# Patient Record
Sex: FEMALE | Race: WHITE | Employment: FULL TIME | URBAN - METROPOLITAN AREA
[De-identification: names, ages, dates, MRNs, and addresses within clinical notes are randomized per-mention and may not be internally consistent; named-entity substitution may affect disease eponyms.]

---

## 2020-02-12 ENCOUNTER — APPOINTMENT (OUTPATIENT)
Dept: CT IMAGING | Age: 35
End: 2020-02-12
Attending: PHYSICIAN ASSISTANT
Payer: OTHER GOVERNMENT

## 2020-02-12 ENCOUNTER — HOSPITAL ENCOUNTER (EMERGENCY)
Age: 35
Discharge: HOME | End: 2020-02-12
Attending: EMERGENCY MEDICINE
Payer: OTHER GOVERNMENT

## 2020-02-12 VITALS
RESPIRATION RATE: 16 BRPM | WEIGHT: 180 LBS | HEART RATE: 100 BPM | DIASTOLIC BLOOD PRESSURE: 70 MMHG | OXYGEN SATURATION: 98 % | BODY MASS INDEX: 32.92 KG/M2 | SYSTOLIC BLOOD PRESSURE: 109 MMHG | TEMPERATURE: 99.2 F

## 2020-02-12 DIAGNOSIS — R10.2 PELVIC PAIN: ICD-10-CM

## 2020-02-12 DIAGNOSIS — R10.84 ABDOMINAL PAIN, GENERALIZED: Primary | ICD-10-CM

## 2020-02-12 DIAGNOSIS — R11.0 NAUSEA: ICD-10-CM

## 2020-02-12 LAB
ALBUMIN SERPL-MCNC: 3.8 G/DL (ref 3.4–5)
ALBUMIN/GLOB SERPL: 0.9 {RATIO} (ref 0.8–1.7)
ALP SERPL-CCNC: 80 U/L (ref 45–117)
ALT SERPL-CCNC: 25 U/L (ref 13–56)
ANION GAP SERPL CALC-SCNC: 11 MMOL/L (ref 3–18)
APPEARANCE UR: CLEAR
AST SERPL-CCNC: 21 U/L (ref 10–38)
BASOPHILS # BLD: 0 K/UL (ref 0–0.1)
BASOPHILS NFR BLD: 0 % (ref 0–2)
BILIRUB SERPL-MCNC: 0.3 MG/DL (ref 0.2–1)
BILIRUB UR QL: NEGATIVE
BUN SERPL-MCNC: 10 MG/DL (ref 7–18)
BUN/CREAT SERPL: 8 (ref 12–20)
CALCIUM SERPL-MCNC: 9 MG/DL (ref 8.5–10.1)
CHLORIDE SERPL-SCNC: 111 MMOL/L (ref 100–111)
CO2 SERPL-SCNC: 22 MMOL/L (ref 21–32)
COLOR UR: YELLOW
CREAT SERPL-MCNC: 1.26 MG/DL (ref 0.6–1.3)
DIFFERENTIAL METHOD BLD: NORMAL
EOSINOPHIL # BLD: 0.3 K/UL (ref 0–0.4)
EOSINOPHIL NFR BLD: 3 % (ref 0–5)
ERYTHROCYTE [DISTWIDTH] IN BLOOD BY AUTOMATED COUNT: 13.6 % (ref 11.6–14.5)
GLOBULIN SER CALC-MCNC: 4.2 G/DL (ref 2–4)
GLUCOSE SERPL-MCNC: 105 MG/DL (ref 74–99)
GLUCOSE UR STRIP.AUTO-MCNC: NEGATIVE MG/DL
HCG UR QL: NEGATIVE
HCT VFR BLD AUTO: 36.6 % (ref 35–45)
HGB BLD-MCNC: 12.2 G/DL (ref 12–16)
HGB UR QL STRIP: NEGATIVE
KETONES UR QL STRIP.AUTO: NEGATIVE MG/DL
LEUKOCYTE ESTERASE UR QL STRIP.AUTO: NEGATIVE
LIPASE SERPL-CCNC: 136 U/L (ref 73–393)
LYMPHOCYTES # BLD: 2.9 K/UL (ref 0.9–3.6)
LYMPHOCYTES NFR BLD: 28 % (ref 21–52)
MCH RBC QN AUTO: 28.8 PG (ref 24–34)
MCHC RBC AUTO-ENTMCNC: 33.3 G/DL (ref 31–37)
MCV RBC AUTO: 86.5 FL (ref 74–97)
MONOCYTES # BLD: 0.7 K/UL (ref 0.05–1.2)
MONOCYTES NFR BLD: 7 % (ref 3–10)
NEUTS SEG # BLD: 6.4 K/UL (ref 1.8–8)
NEUTS SEG NFR BLD: 62 % (ref 40–73)
NITRITE UR QL STRIP.AUTO: NEGATIVE
PH UR STRIP: 8 [PH] (ref 5–8)
PLATELET # BLD AUTO: 384 K/UL (ref 135–420)
PMV BLD AUTO: 9.7 FL (ref 9.2–11.8)
POTASSIUM SERPL-SCNC: 3.5 MMOL/L (ref 3.5–5.5)
PROT SERPL-MCNC: 8 G/DL (ref 6.4–8.2)
PROT UR STRIP-MCNC: NEGATIVE MG/DL
RBC # BLD AUTO: 4.23 M/UL (ref 4.2–5.3)
SODIUM SERPL-SCNC: 144 MMOL/L (ref 136–145)
SP GR UR REFRACTOMETRY: <1.005 (ref 1–1.03)
UROBILINOGEN UR QL STRIP.AUTO: 0.2 EU/DL (ref 0.2–1)
WBC # BLD AUTO: 10.3 K/UL (ref 4.6–13.2)

## 2020-02-12 PROCEDURE — 81025 URINE PREGNANCY TEST: CPT

## 2020-02-12 PROCEDURE — 74011636320 HC RX REV CODE- 636/320: Performed by: EMERGENCY MEDICINE

## 2020-02-12 PROCEDURE — 99283 EMERGENCY DEPT VISIT LOW MDM: CPT

## 2020-02-12 PROCEDURE — 96375 TX/PRO/DX INJ NEW DRUG ADDON: CPT

## 2020-02-12 PROCEDURE — 74177 CT ABD & PELVIS W/CONTRAST: CPT

## 2020-02-12 PROCEDURE — 83690 ASSAY OF LIPASE: CPT

## 2020-02-12 PROCEDURE — 81003 URINALYSIS AUTO W/O SCOPE: CPT

## 2020-02-12 PROCEDURE — 74011250636 HC RX REV CODE- 250/636: Performed by: PHYSICIAN ASSISTANT

## 2020-02-12 PROCEDURE — 85025 COMPLETE CBC W/AUTO DIFF WBC: CPT

## 2020-02-12 PROCEDURE — 96374 THER/PROPH/DIAG INJ IV PUSH: CPT

## 2020-02-12 PROCEDURE — 80053 COMPREHEN METABOLIC PANEL: CPT

## 2020-02-12 RX ORDER — ONDANSETRON 4 MG/1
4 TABLET, ORALLY DISINTEGRATING ORAL
Qty: 15 TAB | Refills: 0 | Status: SHIPPED | OUTPATIENT
Start: 2020-02-12

## 2020-02-12 RX ORDER — MORPHINE SULFATE 4 MG/ML
4 INJECTION, SOLUTION INTRAMUSCULAR; INTRAVENOUS
Status: COMPLETED | OUTPATIENT
Start: 2020-02-12 | End: 2020-02-12

## 2020-02-12 RX ORDER — BUSPIRONE HYDROCHLORIDE 15 MG/1
15 TABLET ORAL 3 TIMES DAILY
COMMUNITY
End: 2020-09-05

## 2020-02-12 RX ORDER — TOPIRAMATE 100 MG/1
TABLET, FILM COATED ORAL 2 TIMES DAILY
COMMUNITY
End: 2020-09-05

## 2020-02-12 RX ORDER — ONDANSETRON 2 MG/ML
4 INJECTION INTRAMUSCULAR; INTRAVENOUS
Status: COMPLETED | OUTPATIENT
Start: 2020-02-12 | End: 2020-02-12

## 2020-02-12 RX ORDER — ACETAMINOPHEN 325 MG/1
650 TABLET ORAL
Qty: 20 TAB | Refills: 0 | Status: SHIPPED | OUTPATIENT
Start: 2020-02-12

## 2020-02-12 RX ORDER — MONTELUKAST SODIUM 10 MG/1
10 TABLET ORAL DAILY
COMMUNITY

## 2020-02-12 RX ADMIN — IOPAMIDOL 100 ML: 612 INJECTION, SOLUTION INTRAVENOUS at 15:42

## 2020-02-12 RX ADMIN — ONDANSETRON 4 MG: 2 INJECTION INTRAMUSCULAR; INTRAVENOUS at 14:36

## 2020-02-12 RX ADMIN — MORPHINE SULFATE 4 MG: 4 INJECTION, SOLUTION INTRAMUSCULAR; INTRAVENOUS at 14:36

## 2020-02-12 RX ADMIN — SODIUM CHLORIDE 1000 ML: 900 INJECTION, SOLUTION INTRAVENOUS at 14:36

## 2020-02-12 NOTE — ED PROVIDER NOTES
EMERGENCY DEPARTMENT HISTORY AND PHYSICAL EXAM    Date: 2/12/2020  Patient Name: Rhett Szymanski    History of Presenting Illness     Chief Complaint   Patient presents with    Abdominal Pain    Nausea         History Provided By: Patient    Chief Complaint: Abdominal pain, nausea  Duration: 3 days  Timing: Worsening  Location: Initially was periumbilical but now is diffuse  Quality: Aching and sharp  Severity: 7 out of 10  Modifying Factors: Movement  Associated Symptoms: none       Additional History (Context): Rhett Szymanski is a 37 y.o. female with a history of hypertension, depression and anxiety who presents today for history as listed above. Patient denies any history of abdominal surgeries. Patient states pain initially began around her umbilical region and now has become diffuse. Denies pain like this in the past.  Denies any surgeries to her abdomen but reports she has had a left ovarian cyst removal.  Denies any concern for pregnancy. Denies any other associated symptoms other than nausea. reports a fever of 100. Denies any constipation, diarrhea or vaginal complaints. Denies any alcohol, tobacco or drug abuse. Reports she has tried Tylenol and Gas-X at home without relief. Reports she tried to get in with her PCP but was unable to. PCP: Lesli Nielson MD    Current Outpatient Medications   Medication Sig Dispense Refill    montelukast (SINGULAIR) 10 mg tablet Take 10 mg by mouth daily.  topiramate (TOPAMAX) 100 mg tablet Take  by mouth two (2) times a day.  busPIRone (BUSPAR) 15 mg tablet Take 15 mg by mouth three (3) times daily.  acetaminophen (TYLENOL) 325 mg tablet Take 2 Tabs by mouth every four (4) hours as needed for Pain. 20 Tab 0    ondansetron (ZOFRAN ODT) 4 mg disintegrating tablet Take 1 Tab by mouth every eight (8) hours as needed for Nausea or Vomiting. 15 Tab 0    omeprazole (PRILOSEC) 20 mg capsule Take 20 mg by mouth daily.       aMILoride-hydroCHLOROthiazide (MODURETIC) 5-50 mg tab Take 1 Tab by mouth daily.  lamoTRIgine (LAMICTAL) 200 mg tablet Take  by mouth daily.  amLODIPine (NORVASC) 10 mg tablet Take  by mouth daily. Past History     Past Medical History:  Past Medical History:   Diagnosis Date    Gastrointestinal disorder     Hypertension     Psychiatric disorder        Past Surgical History:  Past Surgical History:   Procedure Laterality Date    HX GYN      Lt ovarian cyst removal 2017       Family History:  History reviewed. No pertinent family history. Social History:  Social History     Tobacco Use    Smoking status: Never Smoker    Smokeless tobacco: Never Used   Substance Use Topics    Alcohol use: No    Drug use: No       Allergies: Allergies   Allergen Reactions    Dilaudid [Hydromorphone] Anxiety    Nsaids (Non-Steroidal Anti-Inflammatory Drug) Other (comments)     Chronic kidney disease    Other Medication Other (comments)     Safris--restless leg    Sudafed [Pseudoephedrine Hcl] Other (comments)     Upsets her mood and asthma      Tegretol [Carbamazepine] Other (comments)     Rbc off--         Review of Systems   Review of Systems   Constitutional: Negative for chills and fever. HENT: Negative for congestion, rhinorrhea and sore throat. Respiratory: Negative for cough and shortness of breath. Cardiovascular: Negative for chest pain. Gastrointestinal: Positive for abdominal pain and nausea. Negative for blood in stool, constipation, diarrhea and vomiting. Genitourinary: Negative for dysuria, frequency and hematuria. Musculoskeletal: Negative for back pain and myalgias. Skin: Negative for rash and wound. Neurological: Negative for dizziness and headaches. All other systems reviewed and are negative.     All Other Systems Negative  Physical Exam     Vitals:    02/12/20 1321 02/12/20 1545   BP: 133/86 109/70   Pulse: 100    Resp: 16    Temp: 99.2 °F (37.3 °C)    SpO2: 98% 98%   Weight: 81.6 kg (180 lb)      Physical Exam  Vitals signs and nursing note reviewed. Constitutional:       General: She is not in acute distress. Appearance: She is well-developed. She is not diaphoretic. HENT:      Head: Normocephalic and atraumatic. Eyes:      Conjunctiva/sclera: Conjunctivae normal.   Neck:      Musculoskeletal: Normal range of motion and neck supple. Cardiovascular:      Rate and Rhythm: Normal rate and regular rhythm. Heart sounds: Normal heart sounds. Pulmonary:      Effort: Pulmonary effort is normal. No respiratory distress. Breath sounds: Normal breath sounds. Chest:      Chest wall: No tenderness. Abdominal:      General: Bowel sounds are normal. There is no distension. Palpations: Abdomen is soft. Tenderness: There is generalized abdominal tenderness. There is guarding and rebound. Musculoskeletal:         General: No deformity. Skin:     General: Skin is warm and dry. Neurological:      Mental Status: She is alert and oriented to person, place, and time. Deep Tendon Reflexes: Reflexes are normal and symmetric. Psychiatric:         Mood and Affect: Affect is tearful.            Diagnostic Study Results     Labs -     Recent Results (from the past 12 hour(s))   URINALYSIS W/ RFLX MICROSCOPIC    Collection Time: 02/12/20  2:36 PM   Result Value Ref Range    Color YELLOW      Appearance CLEAR      Specific gravity <1.005 (L) 1.005 - 1.030    pH (UA) 8.0 5.0 - 8.0      Protein NEGATIVE  NEG mg/dL    Glucose NEGATIVE  NEG mg/dL    Ketone NEGATIVE  NEG mg/dL    Bilirubin NEGATIVE  NEG      Blood NEGATIVE  NEG      Urobilinogen 0.2 0.2 - 1.0 EU/dL    Nitrites NEGATIVE  NEG      Leukocyte Esterase NEGATIVE  NEG     HCG URINE, QL    Collection Time: 02/12/20  2:36 PM   Result Value Ref Range    HCG urine, QL NEGATIVE  NEG     CBC WITH AUTOMATED DIFF    Collection Time: 02/12/20  2:36 PM   Result Value Ref Range    WBC 10.3 4.6 - 13.2 K/uL RBC 4.23 4.20 - 5.30 M/uL    HGB 12.2 12.0 - 16.0 g/dL    HCT 36.6 35.0 - 45.0 %    MCV 86.5 74.0 - 97.0 FL    MCH 28.8 24.0 - 34.0 PG    MCHC 33.3 31.0 - 37.0 g/dL    RDW 13.6 11.6 - 14.5 %    PLATELET 729 969 - 315 K/uL    MPV 9.7 9.2 - 11.8 FL    NEUTROPHILS 62 40 - 73 %    LYMPHOCYTES 28 21 - 52 %    MONOCYTES 7 3 - 10 %    EOSINOPHILS 3 0 - 5 %    BASOPHILS 0 0 - 2 %    ABS. NEUTROPHILS 6.4 1.8 - 8.0 K/UL    ABS. LYMPHOCYTES 2.9 0.9 - 3.6 K/UL    ABS. MONOCYTES 0.7 0.05 - 1.2 K/UL    ABS. EOSINOPHILS 0.3 0.0 - 0.4 K/UL    ABS. BASOPHILS 0.0 0.0 - 0.1 K/UL    DF AUTOMATED     METABOLIC PANEL, COMPREHENSIVE    Collection Time: 02/12/20  2:36 PM   Result Value Ref Range    Sodium 144 136 - 145 mmol/L    Potassium 3.5 3.5 - 5.5 mmol/L    Chloride 111 100 - 111 mmol/L    CO2 22 21 - 32 mmol/L    Anion gap 11 3.0 - 18 mmol/L    Glucose 105 (H) 74 - 99 mg/dL    BUN 10 7.0 - 18 MG/DL    Creatinine 1.26 0.6 - 1.3 MG/DL    BUN/Creatinine ratio 8 (L) 12 - 20      GFR est AA 58 (L) >60 ml/min/1.73m2    GFR est non-AA 48 (L) >60 ml/min/1.73m2    Calcium 9.0 8.5 - 10.1 MG/DL    Bilirubin, total 0.3 0.2 - 1.0 MG/DL    ALT (SGPT) 25 13 - 56 U/L    AST (SGOT) 21 10 - 38 U/L    Alk. phosphatase 80 45 - 117 U/L    Protein, total 8.0 6.4 - 8.2 g/dL    Albumin 3.8 3.4 - 5.0 g/dL    Globulin 4.2 (H) 2.0 - 4.0 g/dL    A-G Ratio 0.9 0.8 - 1.7     LIPASE    Collection Time: 02/12/20  2:36 PM   Result Value Ref Range    Lipase 136 73 - 393 U/L       Radiologic Studies -   CT ABD PELV W CONT   Final Result   IMPRESSION:      1. Left hydrosalpinx versus cluster of ovarian cysts. 2. Full bladder, likely contributing to mild bilateral renal collecting system   fullness. CT Results  (Last 48 hours)               02/12/20 1542  CT ABD PELV W CONT Final result    Impression:  IMPRESSION:       1. Left hydrosalpinx versus cluster of ovarian cysts.    2. Full bladder, likely contributing to mild bilateral renal collecting system fullness. Narrative:  CT ABDOMEN AND PELVIS WITH ENHANCEMENT       INDICATION: Acute abdominal pain, initially umbilical and now diffuse. TECHNIQUE: Axial images obtained of the abdomen and pelvis following the   uneventful administration of 100 cc Isovue-300 intravenous contrast.  Coronal   and sagittal reformatted images were obtained. All CT scans are performed using   dose optimization techniques as appropriate to the performed exam including the   following: Automated exposure control, adjustment of mA and/or kV according to   patient size, and use of iterative reconstructive technique. COMPARISON: None. FINDINGS:    Lung Base: Minimal atelectasis or scar in the left lower lobe. Liver: Unremarkable. Biliary: Unremarkable. Spleen: Unremarkable. Pancreas: Unremarkable. Adrenal Glands: Unremarkable. Kidneys: Mild bilateral collecting system fullness, without evidence of   obstructing cause. This may be due to full bladder. Bowel: Unremarkable. Normal size appendix. Bladder/ Pelvic Organs: Full bladder. Uterus is present. Cluster of ovarian   cysts versus hydrosalpinx at the left adnexa. Largest cystic components measure   4.2 and 3.4 cm. Peritoneum/ Retroperitoneum: Unremarkable. Lymph Nodes: Unremarkable. Vessels: Unremarkable for age. Bones/Soft tissues: Unremarkable for age. CXR Results  (Last 48 hours)    None            Medical Decision Making   I am the first provider for this patient. I reviewed the vital signs, available nursing notes, past medical history, past surgical history, family history and social history. Vital Signs-Reviewed the patient's vital signs.       Records Reviewed: Nursing Notes and Old Medical Records     Procedures: None   Procedures    Provider Notes (Medical Decision Making):     Differential Diagnosis: biliary disease, hepatitis, pancreatitis, PUD, gastritis, gastroenteritis, hiatal hernia, constipation, SBO, LBO,  IBS, IBD, Celiac disease,  malignancy, PID([facility])      Plan: Order labs, morphine, Zofran and CT of abdomen and pelvis. Order UA and urine pregnant. 4:13 PM  Patient is resting without any difficulties, reports pain has improved. Discussed CT findings with pt and Dr. Yash Umaña. Advised OBGYN and GI follow-up. Will discharge home with tylenol and zofran. Return precautions have been given. Have stressed the importance of hydration and rest as well as PCP follow-up. Patient agrees with the plan and management and states all questions have been thoroughly answered and there are no more remaining questions. MED RECONCILIATION:  No current facility-administered medications for this encounter. Current Outpatient Medications   Medication Sig    montelukast (SINGULAIR) 10 mg tablet Take 10 mg by mouth daily.  topiramate (TOPAMAX) 100 mg tablet Take  by mouth two (2) times a day.  busPIRone (BUSPAR) 15 mg tablet Take 15 mg by mouth three (3) times daily.  acetaminophen (TYLENOL) 325 mg tablet Take 2 Tabs by mouth every four (4) hours as needed for Pain.  ondansetron (ZOFRAN ODT) 4 mg disintegrating tablet Take 1 Tab by mouth every eight (8) hours as needed for Nausea or Vomiting.  omeprazole (PRILOSEC) 20 mg capsule Take 20 mg by mouth daily.  aMILoride-hydroCHLOROthiazide (MODURETIC) 5-50 mg tab Take 1 Tab by mouth daily.  lamoTRIgine (LAMICTAL) 200 mg tablet Take  by mouth daily.  amLODIPine (NORVASC) 10 mg tablet Take  by mouth daily. Disposition:  Home     DISCHARGE NOTE:   Pt has been reexamined. Patient has no new complaints, changes, or physical findings. Care plan outlined and precautions discussed. Results of workup were reviewed with the patient. All medications were reviewed with the patient. All of pt's questions and concerns were addressed.  Patient was instructed and agrees to follow up with PCP as well as to return to the ED upon further deterioration. Patient is ready to go home. Follow-up Information     Follow up With Specialties Details Why Contact Info    [facility] EMERGENCY DEPT Emergency Medicine  As needed [address]  [address] [place], [address] [address] [facility] [address], [address]   [address]  [address] [facility]   [phone] [id number]  [phone]       Follow-up in 1-2 days with OBGYN           Current Discharge Medication List      START taking these medications    Details   acetaminophen (TYLENOL) 325 mg tablet Take 2 Tabs by mouth every four (4) hours as needed for Pain. Qty: 20 Tab, Refills: 0      ondansetron (ZOFRAN ODT) 4 mg disintegrating tablet Take 1 Tab by mouth every eight (8) hours as needed for Nausea or Vomiting. Qty: 15 Tab, Refills: 0                 Diagnosis     Clinical Impression:   1. Abdominal pain, generalized    2. Pelvic pain    3. Nausea          \"Please note that this dictation was completed with [employer], the computer voice recognition software. Quite often unanticipated grammatical, syntax, homophones, and other interpretive errors are inadvertently transcribed by the computer software. Please disregard these errors. Please excuse any errors that have escaped final proofreading. \"

## 2020-02-12 NOTE — ED NOTES
Tamika Storm is a 37 y.o. female that was discharged in stable condition. The patients diagnosis, condition and treatment were explained to  patient and aftercare instructions were given. The patient verbalized understanding. Patient armband removed and shredded.

## 2020-02-12 NOTE — DISCHARGE INSTRUCTIONS

## 2020-02-12 NOTE — LETTER
[address] [place] [address] Dr [facility] EMERGENCY DEPT 
1976 [facility] [id number] [phone] Work/School Note Date: 2/12/2020 To Whom It May concern: 
 
Barber Ortiz was seen and treated today in the emergency room by the following provider(s): 
Attending Provider: Nicole Kayser, MD 
Physician Assistant: MKAAYLA Song. Barber Ortiz may return to work on 2/13/20 Sincerely, MAKAYLA Man

## 2020-09-05 ENCOUNTER — HOSPITAL ENCOUNTER (EMERGENCY)
Age: 35
Discharge: HOME | End: 2020-09-05
Attending: EMERGENCY MEDICINE | Admitting: EMERGENCY MEDICINE
Payer: OTHER GOVERNMENT

## 2020-09-05 ENCOUNTER — APPOINTMENT (OUTPATIENT)
Dept: CT IMAGING | Age: 35
End: 2020-09-05
Attending: NURSE PRACTITIONER
Payer: OTHER GOVERNMENT

## 2020-09-05 VITALS
HEART RATE: 80 BPM | WEIGHT: 184 LBS | RESPIRATION RATE: 16 BRPM | OXYGEN SATURATION: 99 % | HEIGHT: 62 IN | TEMPERATURE: 99.1 F | SYSTOLIC BLOOD PRESSURE: 100 MMHG | BODY MASS INDEX: 33.86 KG/M2 | DIASTOLIC BLOOD PRESSURE: 57 MMHG

## 2020-09-05 DIAGNOSIS — R11.0 NAUSEA WITHOUT VOMITING: ICD-10-CM

## 2020-09-05 DIAGNOSIS — R42 DIZZINESS: ICD-10-CM

## 2020-09-05 DIAGNOSIS — R51.9 NONINTRACTABLE HEADACHE, UNSPECIFIED CHRONICITY PATTERN, UNSPECIFIED HEADACHE TYPE: Primary | ICD-10-CM

## 2020-09-05 LAB
ALBUMIN SERPL-MCNC: 3.5 G/DL (ref 3.4–5)
ALBUMIN/GLOB SERPL: 0.9 {RATIO} (ref 0.8–1.7)
ALP SERPL-CCNC: 61 U/L (ref 45–117)
ALT SERPL-CCNC: 13 U/L (ref 13–56)
ANION GAP SERPL CALC-SCNC: 8 MMOL/L (ref 3–18)
APPEARANCE UR: ABNORMAL
AST SERPL-CCNC: 15 U/L (ref 10–38)
BACTERIA URNS QL MICRO: ABNORMAL /HPF
BASOPHILS # BLD: 0 K/UL (ref 0–0.1)
BASOPHILS NFR BLD: 0 % (ref 0–2)
BILIRUB SERPL-MCNC: 0.1 MG/DL (ref 0.2–1)
BILIRUB UR QL: NEGATIVE
BUN SERPL-MCNC: 9 MG/DL (ref 7–18)
BUN/CREAT SERPL: 6 (ref 12–20)
CALCIUM SERPL-MCNC: 9 MG/DL (ref 8.5–10.1)
CHLORIDE SERPL-SCNC: 112 MMOL/L (ref 100–111)
CO2 SERPL-SCNC: 21 MMOL/L (ref 21–32)
COLOR UR: YELLOW
CREAT SERPL-MCNC: 1.42 MG/DL (ref 0.6–1.3)
DIFFERENTIAL METHOD BLD: ABNORMAL
EOSINOPHIL # BLD: 0.4 K/UL (ref 0–0.4)
EOSINOPHIL NFR BLD: 4 % (ref 0–5)
EPITH CASTS URNS QL MICRO: ABNORMAL /LPF (ref 0–5)
ERYTHROCYTE [DISTWIDTH] IN BLOOD BY AUTOMATED COUNT: 14 % (ref 11.6–14.5)
GLOBULIN SER CALC-MCNC: 4.1 G/DL (ref 2–4)
GLUCOSE SERPL-MCNC: 111 MG/DL (ref 74–99)
GLUCOSE UR STRIP.AUTO-MCNC: NEGATIVE MG/DL
HCG UR QL: NEGATIVE
HCT VFR BLD AUTO: 37 % (ref 35–45)
HGB BLD-MCNC: 12.4 G/DL (ref 12–16)
HGB UR QL STRIP: NEGATIVE
KETONES UR QL STRIP.AUTO: NEGATIVE MG/DL
LEUKOCYTE ESTERASE UR QL STRIP.AUTO: ABNORMAL
LYMPHOCYTES # BLD: 4 K/UL (ref 0.9–3.6)
LYMPHOCYTES NFR BLD: 41 % (ref 21–52)
MCH RBC QN AUTO: 28 PG (ref 24–34)
MCHC RBC AUTO-ENTMCNC: 33.5 G/DL (ref 31–37)
MCV RBC AUTO: 83.5 FL (ref 74–97)
MONOCYTES # BLD: 0.7 K/UL (ref 0.05–1.2)
MONOCYTES NFR BLD: 7 % (ref 3–10)
NEUTS SEG # BLD: 4.8 K/UL (ref 1.8–8)
NEUTS SEG NFR BLD: 48 % (ref 40–73)
NITRITE UR QL STRIP.AUTO: NEGATIVE
PH UR STRIP: 7.5 [PH] (ref 5–8)
PLATELET # BLD AUTO: 443 K/UL (ref 135–420)
PMV BLD AUTO: 9.4 FL (ref 9.2–11.8)
POTASSIUM SERPL-SCNC: 3.6 MMOL/L (ref 3.5–5.5)
PROT SERPL-MCNC: 7.6 G/DL (ref 6.4–8.2)
PROT UR STRIP-MCNC: NEGATIVE MG/DL
RBC # BLD AUTO: 4.43 M/UL (ref 4.2–5.3)
SODIUM SERPL-SCNC: 141 MMOL/L (ref 136–145)
SP GR UR REFRACTOMETRY: <1.005 (ref 1–1.03)
UROBILINOGEN UR QL STRIP.AUTO: 0.2 EU/DL (ref 0.2–1)
WBC # BLD AUTO: 9.9 K/UL (ref 4.6–13.2)
WBC URNS QL MICRO: ABNORMAL /HPF (ref 0–4)

## 2020-09-05 PROCEDURE — 81025 URINE PREGNANCY TEST: CPT

## 2020-09-05 PROCEDURE — 96361 HYDRATE IV INFUSION ADD-ON: CPT

## 2020-09-05 PROCEDURE — 96375 TX/PRO/DX INJ NEW DRUG ADDON: CPT

## 2020-09-05 PROCEDURE — 99283 EMERGENCY DEPT VISIT LOW MDM: CPT

## 2020-09-05 PROCEDURE — 81001 URINALYSIS AUTO W/SCOPE: CPT

## 2020-09-05 PROCEDURE — 70450 CT HEAD/BRAIN W/O DYE: CPT

## 2020-09-05 PROCEDURE — 80053 COMPREHEN METABOLIC PANEL: CPT

## 2020-09-05 PROCEDURE — 85025 COMPLETE CBC W/AUTO DIFF WBC: CPT

## 2020-09-05 PROCEDURE — 74011250636 HC RX REV CODE- 250/636: Performed by: NURSE PRACTITIONER

## 2020-09-05 PROCEDURE — 96374 THER/PROPH/DIAG INJ IV PUSH: CPT

## 2020-09-05 RX ORDER — TOPIRAMATE 100 MG/1
100 TABLET, FILM COATED ORAL
COMMUNITY
End: 2020-10-18

## 2020-09-05 RX ORDER — TOPIRAMATE 100 MG/1
150 TABLET, FILM COATED ORAL 2 TIMES DAILY
COMMUNITY

## 2020-09-05 RX ORDER — ACETAMINOPHEN AND CODEINE PHOSPHATE 120; 12 MG/5ML; MG/5ML
SOLUTION ORAL
COMMUNITY

## 2020-09-05 RX ORDER — DIPHENHYDRAMINE HYDROCHLORIDE 50 MG/ML
50 INJECTION, SOLUTION INTRAMUSCULAR; INTRAVENOUS ONCE
Status: COMPLETED | OUTPATIENT
Start: 2020-09-05 | End: 2020-09-05

## 2020-09-05 RX ORDER — BUTALBITAL, ACETAMINOPHEN AND CAFFEINE 300; 40; 50 MG/1; MG/1; MG/1
1 CAPSULE ORAL
COMMUNITY
End: 2021-06-11

## 2020-09-05 RX ORDER — OMEPRAZOLE 40 MG/1
40 CAPSULE, DELAYED RELEASE ORAL DAILY
COMMUNITY

## 2020-09-05 RX ORDER — ALBUTEROL SULFATE 90 UG/1
AEROSOL, METERED RESPIRATORY (INHALATION)
COMMUNITY

## 2020-09-05 RX ORDER — PROCHLORPERAZINE EDISYLATE 5 MG/ML
10 INJECTION INTRAMUSCULAR; INTRAVENOUS ONCE
Status: COMPLETED | OUTPATIENT
Start: 2020-09-05 | End: 2020-09-05

## 2020-09-05 RX ORDER — FENOFIBRATE 145 MG/1
145 TABLET, COATED ORAL DAILY
COMMUNITY

## 2020-09-05 RX ORDER — DEXAMETHASONE SODIUM PHOSPHATE 4 MG/ML
10 INJECTION, SOLUTION INTRA-ARTICULAR; INTRALESIONAL; INTRAMUSCULAR; INTRAVENOUS; SOFT TISSUE
Status: COMPLETED | OUTPATIENT
Start: 2020-09-05 | End: 2020-09-05

## 2020-09-05 RX ORDER — PRAZOSIN HYDROCHLORIDE 1 MG/1
3 CAPSULE ORAL
COMMUNITY

## 2020-09-05 RX ORDER — BUSPIRONE HYDROCHLORIDE 10 MG/1
10 TABLET ORAL 2 TIMES DAILY
COMMUNITY

## 2020-09-05 RX ORDER — AMLODIPINE BESYLATE 5 MG/1
5 TABLET ORAL DAILY
COMMUNITY

## 2020-09-05 RX ORDER — PROMETHAZINE HYDROCHLORIDE 12.5 MG/1
12.5 TABLET ORAL
COMMUNITY

## 2020-09-05 RX ADMIN — DIPHENHYDRAMINE HYDROCHLORIDE 50 MG: 50 INJECTION, SOLUTION INTRAMUSCULAR; INTRAVENOUS at 16:56

## 2020-09-05 RX ADMIN — SODIUM CHLORIDE 1000 ML: 900 INJECTION, SOLUTION INTRAVENOUS at 16:52

## 2020-09-05 RX ADMIN — DEXAMETHASONE SODIUM PHOSPHATE 10 MG: 4 INJECTION, SOLUTION INTRAMUSCULAR; INTRAVENOUS at 17:54

## 2020-09-05 RX ADMIN — PROCHLORPERAZINE EDISYLATE 10 MG: 5 INJECTION INTRAMUSCULAR; INTRAVENOUS at 16:56

## 2020-09-05 NOTE — DISCHARGE INSTRUCTIONS
Patient Education        Dizziness: Care Instructions  Your Care Instructions  Dizziness is the feeling of unsteadiness or fuzziness in your head. It is different than having vertigo, which is a feeling that the room is spinning or that you are moving or falling. It is also different from lightheadedness, which is the feeling that you are about to faint. It can be hard to know what causes dizziness. Some people feel dizzy when they have migraine headaches. Sometimes bouts of flu can make you feel dizzy. Some medical conditions, such as heart problems or high blood pressure, can make you feel dizzy. Many medicines can cause dizziness, including medicines for high blood pressure, pain, or anxiety. If a medicine causes your symptoms, your doctor may recommend that you stop or change the medicine. If it is a problem with your heart, you may need medicine to help your heart work better. If there is no clear reason for your symptoms, your doctor may suggest watching and waiting for a while to see if the dizziness goes away on its own. Follow-up care is a key part of your treatment and safety. Be sure to make and go to all appointments, and call your doctor if you are having problems. It's also a good idea to know your test results and keep a list of the medicines you take. How can you care for yourself at home? · If your doctor recommends or prescribes medicine, take it exactly as directed. Call your doctor if you think you are having a problem with your medicine. · Do not drive while you feel dizzy. · Try to prevent falls. Steps you can take include:  ? Using nonskid mats, adding grab bars near the tub, and using night-lights. ? Clearing your home so that walkways are free of anything you might trip on.  ? Letting family and friends know that you have been feeling dizzy. This will help them know how to help you. When should you call for help? Call 911 anytime you think you may need emergency care.  For example, call if:    · You passed out (lost consciousness).     · You have dizziness along with symptoms of a heart attack. These may include:  ? Chest pain or pressure, or a strange feeling in the chest.  ? Sweating. ? Shortness of breath. ? Nausea or vomiting. ? Pain, pressure, or a strange feeling in the back, neck, jaw, or upper belly or in one or both shoulders or arms. ? Lightheadedness or sudden weakness. ? A fast or irregular heartbeat.     · You have symptoms of a stroke. These may include:  ? Sudden numbness, tingling, weakness, or loss of movement in your face, arm, or leg, especially on only one side of your body. ? Sudden vision changes. ? Sudden trouble speaking. ? Sudden confusion or trouble understanding simple statements. ? Sudden problems with walking or balance. ? A sudden, severe headache that is different from past headaches. Call your doctor now or seek immediate medical care if:    · You feel dizzy and have a fever, headache, or ringing in your ears.     · You have new or increased nausea and vomiting.     · Your dizziness does not go away or comes back. Watch closely for changes in your health, and be sure to contact your doctor if:    · You do not get better as expected. Where can you learn more? Go to http://delia-marilee.info/  Enter Q823 in the search box to learn more about \"Dizziness: Care Instructions. \"  Current as of: June 26, 2019               Content Version: 12.6  © 8111-4140 [employer]. Care instructions adapted under license by [employer] (which disclaims liability or warranty for this information). If you have questions about a medical condition or this instruction, always ask your healthcare professional. [place] [address] any warranty or liability for your use of this information. Patient Education        Headache: Care Instructions  Your Care Instructions     Headaches have many possible causes. Most headaches aren't a sign of a more serious problem, and they will get better on their own. Home treatment may help you feel better faster. The doctor has checked you carefully, but problems can develop later. If you notice any problems or new symptoms, get medical treatment right away. Follow-up care is a key part of your treatment and safety. Be sure to make and go to all appointments, and call your doctor if you are having problems. It's also a good idea to know your test results and keep a list of the medicines you take. How can you care for yourself at home? · Do not drive if you have taken a prescription pain medicine. · Rest in a quiet, dark room until your headache is gone. Close your eyes and try to relax or go to sleep. Don't watch TV or read. · Put a cold, moist cloth or cold pack on the painful area for 10 to 20 minutes at a time. Put a thin cloth between the cold pack and your skin. · Use a warm, moist towel or a heating pad set on low to relax tight shoulder and neck muscles. · Have someone gently massage your neck and shoulders. · Take pain medicines exactly as directed. ? If the doctor gave you a prescription medicine for pain, take it as prescribed. ? If you are not taking a prescription pain medicine, ask your doctor if you can take an over-the-counter medicine. · Be careful not to take pain medicine more often than the instructions allow, because you may get worse or more frequent headaches when the medicine wears off. · Do not ignore new symptoms that occur with a headache, such as a fever, weakness or numbness, vision changes, or confusion. These may be signs of a more serious problem. To prevent headaches  · Keep a headache diary so you can figure out what triggers your headaches. Avoiding triggers may help you prevent headaches. Record when each headache began, how long it lasted, and what the pain was like (throbbing, aching, stabbing, or dull).  Write down any other symptoms you had with the headache, such as nausea, flashing lights or dark spots, or sensitivity to bright light or loud noise. Note if the headache occurred near your period. List anything that might have triggered the headache, such as certain foods (chocolate, cheese, wine) or odors, smoke, bright light, stress, or lack of sleep. · Find healthy ways to deal with stress. Headaches are most common during or right after stressful times. Take time to relax before and after you do something that has caused a headache in the past.  · Try to keep your muscles relaxed by keeping good posture. Check your jaw, face, neck, and shoulder muscles for tension, and try relaxing them. When sitting at a desk, change positions often, and stretch for 30 seconds each hour. · Get plenty of sleep and exercise. · Eat regularly and well. Long periods without food can trigger a headache. · Treat yourself to a massage. Some people find that regular massages are very helpful in relieving tension. · Limit caffeine by not drinking too much coffee, tea, or soda. But don't quit caffeine suddenly, because that can also give you headaches. · Reduce eyestrain from computers by blinking frequently and looking away from the computer screen every so often. Make sure you have proper eyewear and that your monitor is set up properly, about an arm's length away. · Seek help if you have depression or anxiety. Your headaches may be linked to these conditions. Treatment can both prevent headaches and help with symptoms of anxiety or depression. When should you call for help? Call 911 anytime you think you may need emergency care. For example, call if:    · You have signs of a stroke. These may include:  ? Sudden numbness, paralysis, or weakness in your face, arm, or leg, especially on only one side of your body. ? Sudden vision changes. ? Sudden trouble speaking. ? Sudden confusion or trouble understanding simple statements.   ? Sudden problems with walking or balance. ? A sudden, severe headache that is different from past headaches. Call your doctor now or seek immediate medical care if:    · You have a new or worse headache.     · Your headache gets much worse. Where can you learn more? Go to http://delia-marilee.info/  Enter M271 in the search box to learn more about \"Headache: Care Instructions. \"  Current as of: November 20, 2019               Content Version: 12.6  © 8737-7696 [employer]. Care instructions adapted under license by [employer] (which disclaims liability or warranty for this information). If you have questions about a medical condition or this instruction, always ask your healthcare professional. [address] any warranty or liability for your use of this information. Return to ED if symptoms worsen. Continue taking fioricet and phenergan as needed.

## 2020-09-05 NOTE — ED NOTES
Yvette Carney is a 38 y.o. female that was discharged in good condition. The patients diagnosis, condition and treatment were explained to  patient and aftercare instructions were given. The patient verbalized understanding. Patient armband removed and shredded.

## 2020-09-05 NOTE — ED TRIAGE NOTES
Patient states having migraine headache since yesterday. She states being evaluated at urgent care last night. She states being prescribed Fioricet and Phenergan for symptoms. States taking 4 doses of each since evaluation.  Advises that she took phenergan prior to arrival.

## 2020-09-05 NOTE — ED PROVIDER NOTES
EMERGENCY DEPARTMENT HISTORY AND PHYSICAL EXAM    5:45 PM      Date: 9/5/2020  Patient Name: Donna Raymond    History of Presenting Illness     Chief Complaint   Patient presents with    Migraine    Dizziness    Nausea         History Provided By: Patient    Additional History (Context): Donna Raymond is a 38 y.o. female with History of migraines, end-stage kidney failure 3, hypertension, psychiatric problems, GERD who presents with complaint of migraine headache that started yesterday around 5 AM.  Patient reports she wants to patient first and was given Fioricet which relieved the symptoms temporarily but he came back. Patient was also given Phenergan for her symptoms. Patient reports taking Phenergan about 2 hours before prior to coming to ED. Patient reports her headache is worse today and is on the left side of her face. Patient reports she felt nauseous and dizzy. Patient denies any numbness, extremity weakness, chest pain, shortness of breath, visual disturbance. Patient denies any fevers or neck rigidity. PCP: Lilian Gaston MD    Current Facility-Administered Medications   Medication Dose Route Frequency Provider Last Rate Last Dose    dexamethasone (DECADRON) 4 mg/mL injection 10 mg  10 mg IntraVENous NOW Annamarie Brown NP         Current Outpatient Medications   Medication Sig Dispense Refill    amLODIPine (NORVASC) 5 mg tablet Take 5 mg by mouth daily.  omeprazole (PRILOSEC) 40 mg capsule Take 40 mg by mouth daily.  norethindrone (MICRONOR) 0.35 mg tab Take  by mouth.  topiramate (TOPAMAX) 100 mg tablet Take 150 mg by mouth daily.  topiramate (Topamax) 100 mg tablet Take 100 mg by mouth nightly.  prazosin (MINIPRESS) 1 mg capsule Take 3 mg by mouth nightly.  albuterol (PROVENTIL HFA, VENTOLIN HFA, PROAIR HFA) 90 mcg/actuation inhaler Take  by inhalation.       promethazine (PHENERGAN) 12.5 mg tablet Take 12.5 mg by mouth every six (6) hours as needed for Nausea.  fenofibrate nanocrystallized (Tricor) 145 mg tablet Take 145 mg by mouth daily.  busPIRone (BUSPAR) 10 mg tablet Take 10 mg by mouth two (2) times a day.  cpap machine kit by Does Not Apply route.  butalbital-acetaminophen-caff (Fioricet) -40 mg per capsule Take 1 Cap by mouth every four (4) hours as needed for Headache.  montelukast (SINGULAIR) 10 mg tablet Take 10 mg by mouth daily.  acetaminophen (TYLENOL) 325 mg tablet Take 2 Tabs by mouth every four (4) hours as needed for Pain. 20 Tab 0    ondansetron (ZOFRAN ODT) 4 mg disintegrating tablet Take 1 Tab by mouth every eight (8) hours as needed for Nausea or Vomiting. 15 Tab 0    lamoTRIgine (LAMICTAL) 200 mg tablet Take 200 mg by mouth two (2) times a day. Past History     Past Medical History:  Past Medical History:   Diagnosis Date    Endometriosis     Gastrointestinal disorder     Hypertension     Migraine     Psychiatric disorder        Past Surgical History:  Past Surgical History:   Procedure Laterality Date    HX GYN      Lt ovarian cyst removal 2017       Family History:  History reviewed. No pertinent family history. Social History:  Social History     Tobacco Use    Smoking status: Never Smoker    Smokeless tobacco: Never Used   Substance Use Topics    Alcohol use: No    Drug use: No       Allergies: Allergies   Allergen Reactions    Bacitracin Unknown (comments)    Dilaudid [Hydromorphone] Anxiety    Gold Sodium Thiomalate Unknown (comments)    Neomycin Unknown (comments)    Nsaids (Non-Steroidal Anti-Inflammatory Drug) Other (comments)     Chronic kidney disease    Other Medication Other (comments)     Safris--restless leg    Sudafed [Pseudoephedrine Hcl] Other (comments)     Upsets her mood and asthma      Tegretol [Carbamazepine] Other (comments)     Rbc off--         Review of Systems       Review of Systems   Constitutional: Negative for chills and fever. Respiratory: Negative for shortness of breath. Cardiovascular: Negative for chest pain. Gastrointestinal: Positive for nausea. Negative for abdominal pain and vomiting. Skin: Negative for rash. Neurological: Positive for dizziness and headaches. Negative for weakness. All other systems reviewed and are negative. Physical Exam     Visit Vitals  BP (!) 155/115 (BP 1 Location: Left arm, BP Patient Position: At rest)   Pulse (!) 115   Temp 99.1 °F (37.3 °C)   Resp 20   Ht 5' 2\" (1.575 m)   Wt 83.5 kg (184 lb)   LMP 03/05/2020   SpO2 100%   BMI 33.65 kg/m²         Physical Exam  Vitals signs reviewed. Constitutional:       General: She is not in acute distress. Appearance: Normal appearance. She is well-developed. She is not ill-appearing or toxic-appearing. Comments: Pt looks uncomfortable. Teary eyes. HENT:      Head: Normocephalic and atraumatic. Eyes:      Extraocular Movements: Extraocular movements intact. Right eye: Normal extraocular motion and no nystagmus. Left eye: Normal extraocular motion and no nystagmus. Conjunctiva/sclera: Conjunctivae normal.      Pupils: Pupils are equal, round, and reactive to light. Comments: No nystagmus. Normal EOM's. Eyes PERRLA   Neck:      Musculoskeletal: Normal range of motion. No neck rigidity or pain with movement. Trachea: Trachea normal.   Cardiovascular:      Rate and Rhythm: Normal rate and regular rhythm. Pulmonary:      Effort: Pulmonary effort is normal.      Breath sounds: Normal breath sounds. Abdominal:      General: Bowel sounds are normal. There is no distension or abdominal bruit. Palpations: Abdomen is soft. Abdomen is not rigid. There is no shifting dullness, fluid wave, mass or pulsatile mass. Tenderness: There is no abdominal tenderness. There is no guarding. Negative signs include Garay's sign and McBurney's sign. Neurological:      General: No focal deficit present.       Mental Status: She is alert and oriented to person, place, and time. Mental status is at baseline. Comments: Neurologically intact. Diagnostic Study Results     Labs -  Recent Results (from the past 12 hour(s))   CBC WITH AUTOMATED DIFF    Collection Time: 09/05/20  4:47 PM   Result Value Ref Range    WBC 9.9 4.6 - 13.2 K/uL    RBC 4.43 4.20 - 5.30 M/uL    HGB 12.4 12.0 - 16.0 g/dL    HCT 37.0 35.0 - 45.0 %    MCV 83.5 74.0 - 97.0 FL    MCH 28.0 24.0 - 34.0 PG    MCHC 33.5 31.0 - 37.0 g/dL    RDW 14.0 11.6 - 14.5 %    PLATELET 277 (H) 592 - 420 K/uL    MPV 9.4 9.2 - 11.8 FL    NEUTROPHILS 48 40 - 73 %    LYMPHOCYTES 41 21 - 52 %    MONOCYTES 7 3 - 10 %    EOSINOPHILS 4 0 - 5 %    BASOPHILS 0 0 - 2 %    ABS. NEUTROPHILS 4.8 1.8 - 8.0 K/UL    ABS. LYMPHOCYTES 4.0 (H) 0.9 - 3.6 K/UL    ABS. MONOCYTES 0.7 0.05 - 1.2 K/UL    ABS. EOSINOPHILS 0.4 0.0 - 0.4 K/UL    ABS. BASOPHILS 0.0 0.0 - 0.1 K/UL    DF AUTOMATED     METABOLIC PANEL, COMPREHENSIVE    Collection Time: 09/05/20  4:47 PM   Result Value Ref Range    Sodium 141 136 - 145 mmol/L    Potassium 3.6 3.5 - 5.5 mmol/L    Chloride 112 (H) 100 - 111 mmol/L    CO2 21 21 - 32 mmol/L    Anion gap 8 3.0 - 18 mmol/L    Glucose 111 (H) 74 - 99 mg/dL    BUN 9 7.0 - 18 MG/DL    Creatinine 1.42 (H) 0.6 - 1.3 MG/DL    BUN/Creatinine ratio 6 (L) 12 - 20      GFR est AA 50 (L) >60 ml/min/1.73m2    GFR est non-AA 42 (L) >60 ml/min/1.73m2    Calcium 9.0 8.5 - 10.1 MG/DL    Bilirubin, total 0.1 (L) 0.2 - 1.0 MG/DL    ALT (SGPT) 13 13 - 56 U/L    AST (SGOT) 15 10 - 38 U/L    Alk.  phosphatase 61 45 - 117 U/L    Protein, total 7.6 6.4 - 8.2 g/dL    Albumin 3.5 3.4 - 5.0 g/dL    Globulin 4.1 (H) 2.0 - 4.0 g/dL    A-G Ratio 0.9 0.8 - 1.7     URINALYSIS W/ RFLX MICROSCOPIC    Collection Time: 09/05/20  4:48 PM   Result Value Ref Range    Color YELLOW      Appearance CLOUDY      Specific gravity <1.005 (L) 1.005 - 1.030    pH (UA) 7.5 5.0 - 8.0      Protein Negative NEG mg/dL Glucose Negative NEG mg/dL    Ketone Negative NEG mg/dL    Bilirubin Negative NEG      Blood Negative NEG      Urobilinogen 0.2 0.2 - 1.0 EU/dL    Nitrites Negative NEG      Leukocyte Esterase SMALL (A) NEG     HCG URINE, QL    Collection Time: 09/05/20  4:48 PM   Result Value Ref Range    HCG urine, QL Negative NEG     URINE MICROSCOPIC ONLY    Collection Time: 09/05/20  4:48 PM   Result Value Ref Range    WBC 0 to 3 0 - 4 /hpf    Epithelial cells 2+ 0 - 5 /lpf    Bacteria 1+ (A) NEG /hpf       Radiologic Studies -   CT HEAD WO CONT   Final Result   IMPRESSION:                  No acute abnormalities. Medical Decision Making   I am the first provider for this patient. I reviewed the vital signs, available nursing notes, past medical history, past surgical history, family history and social history. Vital Signs-Reviewed the patient's vital signs. Records Reviewed: Nursing Notes and Old Medical Records (Time of Review: 5:45 PM)    ED Course: Progress Notes, Reevaluation, and Consults:  5:45 PM Pt was sleeping. Stated she felt a bit better. Will reassess. 6:16 PM Pt resting comfortably, vital signs improved greatly. Pt reports pain is down to 4/10 and feels ready to go home. Plan to discharge patient. Pt in no acute distress. Provider Notes (Medical Decision Making):   38 y.o. female with History of migraines, end-stage kidney failure 3, hypertension, psychiatric problems, GERD who presents with complaint of migraine headache that started yesterday around 5 AM.  Patient reports she wants to patient first and was given Fioricet which relieved the symptoms temporarily but he came back. Patient was also given Phenergan for her symptoms. Patient reports taking Phenergan about 2 hours before prior to coming to ED. Patient reports her headache is worse today and is on the left side of her face. Patient reports she felt nauseous and dizzy.   Patient denies any numbness, extremity weakness, chest pain, shortness of breath, visual disturbance. Patient denies any fevers or neck rigidity. Pt given migraine cocktail. Ct done since symptoms kept persisting and patient voiced onset of nausea and dizziness. Ct was unremarkable. Pt symptoms improved with migraine cocktail and is resting comfortably. Pt reports the headache is consistent with her prior migraines. I do not suspect stroke at this time. Pt neurologically intact. Labs showed elevated creatinine and decreased GFR which is consistent with her kidney disease. Plan to discharge patient to follow up with her neurologist.     Diagnosis     Clinical Impression: No diagnosis found. Disposition: home     Follow-up Information    None          Patient's Medications   Start Taking    No medications on file   Continue Taking    ACETAMINOPHEN (TYLENOL) 325 MG TABLET    Take 2 Tabs by mouth every four (4) hours as needed for Pain. ALBUTEROL (PROVENTIL HFA, VENTOLIN HFA, PROAIR HFA) 90 MCG/ACTUATION INHALER    Take  by inhalation. AMLODIPINE (NORVASC) 5 MG TABLET    Take 5 mg by mouth daily. BUSPIRONE (BUSPAR) 10 MG TABLET    Take 10 mg by mouth two (2) times a day. BUTALBITAL-ACETAMINOPHEN-CAFF (FIORICET) -40 MG PER CAPSULE    Take 1 Cap by mouth every four (4) hours as needed for Headache. CPAP MACHINE KIT    by Does Not Apply route. FENOFIBRATE NANOCRYSTALLIZED (TRICOR) 145 MG TABLET    Take 145 mg by mouth daily. LAMOTRIGINE (LAMICTAL) 200 MG TABLET    Take 200 mg by mouth two (2) times a day. MONTELUKAST (SINGULAIR) 10 MG TABLET    Take 10 mg by mouth daily. NORETHINDRONE (MICRONOR) 0.35 MG TAB    Take  by mouth. OMEPRAZOLE (PRILOSEC) 40 MG CAPSULE    Take 40 mg by mouth daily. ONDANSETRON (ZOFRAN ODT) 4 MG DISINTEGRATING TABLET    Take 1 Tab by mouth every eight (8) hours as needed for Nausea or Vomiting. PRAZOSIN (MINIPRESS) 1 MG CAPSULE    Take 3 mg by mouth nightly.     PROMETHAZINE (PHENERGAN) 12.5 MG TABLET    Take 12.5 mg by mouth every six (6) hours as needed for Nausea. TOPIRAMATE (TOPAMAX) 100 MG TABLET    Take 150 mg by mouth daily. TOPIRAMATE (TOPAMAX) 100 MG TABLET    Take 100 mg by mouth nightly. These Medications have changed    No medications on file   Stop Taking    AMILORIDE-HYDROCHLOROTHIAZIDE (MODURETIC) 5-50 MG TAB    Take 1 Tab by mouth daily. AMLODIPINE (NORVASC) 10 MG TABLET    Take  by mouth daily. BUSPIRONE (BUSPAR) 15 MG TABLET    Take 15 mg by mouth three (3) times daily. OMEPRAZOLE (PRILOSEC) 20 MG CAPSULE    Take 20 mg by mouth daily. TOPIRAMATE (TOPAMAX) 100 MG TABLET    Take  by mouth two (2) times a day. Dictation disclaimer:  Please note that this dictation was completed with [email or web address], the [employer] voice recognition software. Quite often unanticipated grammatical, syntax, homophones, and other interpretive errors are inadvertently transcribed by the computer software. Please disregard these errors. Please excuse any errors that have escaped final proofreading.

## 2020-09-07 PROCEDURE — 99284 EMERGENCY DEPT VISIT MOD MDM: CPT

## 2020-09-07 PROCEDURE — 96365 THER/PROPH/DIAG IV INF INIT: CPT

## 2020-09-07 PROCEDURE — 96372 THER/PROPH/DIAG INJ SC/IM: CPT

## 2020-09-07 PROCEDURE — 96375 TX/PRO/DX INJ NEW DRUG ADDON: CPT

## 2020-09-08 ENCOUNTER — APPOINTMENT (OUTPATIENT)
Dept: CT IMAGING | Age: 35
End: 2020-09-08
Attending: EMERGENCY MEDICINE
Payer: OTHER GOVERNMENT

## 2020-09-08 ENCOUNTER — HOSPITAL ENCOUNTER (EMERGENCY)
Age: 35
Discharge: HOME | End: 2020-09-08
Attending: EMERGENCY MEDICINE
Payer: OTHER GOVERNMENT

## 2020-09-08 VITALS
SYSTOLIC BLOOD PRESSURE: 108 MMHG | TEMPERATURE: 98.6 F | BODY MASS INDEX: 33.86 KG/M2 | HEART RATE: 72 BPM | RESPIRATION RATE: 18 BRPM | WEIGHT: 184 LBS | HEIGHT: 62 IN | DIASTOLIC BLOOD PRESSURE: 79 MMHG | OXYGEN SATURATION: 99 %

## 2020-09-08 DIAGNOSIS — G43.801 OTHER MIGRAINE WITH STATUS MIGRAINOSUS, NOT INTRACTABLE: Primary | ICD-10-CM

## 2020-09-08 LAB
ANION GAP SERPL CALC-SCNC: 8 MMOL/L (ref 3–18)
BASOPHILS # BLD: 0 K/UL (ref 0–0.1)
BASOPHILS NFR BLD: 0 % (ref 0–2)
BUN SERPL-MCNC: 16 MG/DL (ref 7–18)
BUN/CREAT SERPL: 12 (ref 12–20)
CALCIUM SERPL-MCNC: 8.7 MG/DL (ref 8.5–10.1)
CHLORIDE SERPL-SCNC: 113 MMOL/L (ref 100–111)
CO2 SERPL-SCNC: 20 MMOL/L (ref 21–32)
CREAT SERPL-MCNC: 1.29 MG/DL (ref 0.6–1.3)
CRP SERPL-MCNC: 1.5 MG/DL (ref 0–0.3)
DIFFERENTIAL METHOD BLD: ABNORMAL
EOSINOPHIL # BLD: 0.3 K/UL (ref 0–0.4)
EOSINOPHIL NFR BLD: 3 % (ref 0–5)
ERYTHROCYTE [DISTWIDTH] IN BLOOD BY AUTOMATED COUNT: 13.9 % (ref 11.6–14.5)
ERYTHROCYTE [SEDIMENTATION RATE] IN BLOOD: 12 MM/HR (ref 0–20)
GLUCOSE SERPL-MCNC: 109 MG/DL (ref 74–99)
HCG SERPL QL: NEGATIVE
HCT VFR BLD AUTO: 35.4 % (ref 35–45)
HGB BLD-MCNC: 12.1 G/DL (ref 12–16)
LYMPHOCYTES # BLD: 4.4 K/UL (ref 0.9–3.6)
LYMPHOCYTES NFR BLD: 37 % (ref 21–52)
MCH RBC QN AUTO: 28.5 PG (ref 24–34)
MCHC RBC AUTO-ENTMCNC: 34.2 G/DL (ref 31–37)
MCV RBC AUTO: 83.5 FL (ref 74–97)
MONOCYTES # BLD: 0.5 K/UL (ref 0.05–1.2)
MONOCYTES NFR BLD: 5 % (ref 3–10)
NEUTS SEG # BLD: 6.7 K/UL (ref 1.8–8)
NEUTS SEG NFR BLD: 55 % (ref 40–73)
PLATELET # BLD AUTO: 401 K/UL (ref 135–420)
PMV BLD AUTO: 9.7 FL (ref 9.2–11.8)
POTASSIUM SERPL-SCNC: 3.4 MMOL/L (ref 3.5–5.5)
RBC # BLD AUTO: 4.24 M/UL (ref 4.2–5.3)
SODIUM SERPL-SCNC: 141 MMOL/L (ref 136–145)
WBC # BLD AUTO: 12 K/UL (ref 4.6–13.2)

## 2020-09-08 PROCEDURE — 74011000636 HC RX REV CODE- 636: Performed by: EMERGENCY MEDICINE

## 2020-09-08 PROCEDURE — 84703 CHORIONIC GONADOTROPIN ASSAY: CPT

## 2020-09-08 PROCEDURE — 85652 RBC SED RATE AUTOMATED: CPT

## 2020-09-08 PROCEDURE — 96365 THER/PROPH/DIAG IV INF INIT: CPT

## 2020-09-08 PROCEDURE — 86140 C-REACTIVE PROTEIN: CPT

## 2020-09-08 PROCEDURE — 96372 THER/PROPH/DIAG INJ SC/IM: CPT

## 2020-09-08 PROCEDURE — 70496 CT ANGIOGRAPHY HEAD: CPT

## 2020-09-08 PROCEDURE — 74011250636 HC RX REV CODE- 250/636: Performed by: EMERGENCY MEDICINE

## 2020-09-08 PROCEDURE — 74011636637 HC RX REV CODE- 636/637: Performed by: EMERGENCY MEDICINE

## 2020-09-08 PROCEDURE — 80048 BASIC METABOLIC PNL TOTAL CA: CPT

## 2020-09-08 PROCEDURE — 96375 TX/PRO/DX INJ NEW DRUG ADDON: CPT

## 2020-09-08 PROCEDURE — 74011250637 HC RX REV CODE- 250/637: Performed by: EMERGENCY MEDICINE

## 2020-09-08 PROCEDURE — 85025 COMPLETE CBC W/AUTO DIFF WBC: CPT

## 2020-09-08 RX ORDER — DIPHENHYDRAMINE HYDROCHLORIDE 50 MG/ML
12.5 INJECTION, SOLUTION INTRAMUSCULAR; INTRAVENOUS
Status: COMPLETED | OUTPATIENT
Start: 2020-09-08 | End: 2020-09-08

## 2020-09-08 RX ORDER — DIVALPROEX SODIUM 125 MG/1
1000 CAPSULE, COATED PELLETS ORAL ONCE
Status: DISCONTINUED | OUTPATIENT
Start: 2020-09-08 | End: 2020-09-08

## 2020-09-08 RX ORDER — DIVALPROEX SODIUM 500 MG/1
1000 TABLET, EXTENDED RELEASE ORAL ONCE
Status: DISCONTINUED | OUTPATIENT
Start: 2020-09-08 | End: 2020-09-08

## 2020-09-08 RX ORDER — METOCLOPRAMIDE HYDROCHLORIDE 5 MG/ML
10 INJECTION INTRAMUSCULAR; INTRAVENOUS
Status: COMPLETED | OUTPATIENT
Start: 2020-09-08 | End: 2020-09-08

## 2020-09-08 RX ORDER — PROCHLORPERAZINE EDISYLATE 5 MG/ML
10 INJECTION INTRAMUSCULAR; INTRAVENOUS ONCE
Status: COMPLETED | OUTPATIENT
Start: 2020-09-08 | End: 2020-09-08

## 2020-09-08 RX ORDER — SUMATRIPTAN 6 MG/.5ML
6 INJECTION, SOLUTION SUBCUTANEOUS ONCE
Status: COMPLETED | OUTPATIENT
Start: 2020-09-08 | End: 2020-09-08

## 2020-09-08 RX ORDER — LORAZEPAM 2 MG/ML
1 INJECTION INTRAMUSCULAR
Status: COMPLETED | OUTPATIENT
Start: 2020-09-08 | End: 2020-09-08

## 2020-09-08 RX ORDER — MAGNESIUM SULFATE HEPTAHYDRATE 40 MG/ML
2 INJECTION, SOLUTION INTRAVENOUS ONCE
Status: COMPLETED | OUTPATIENT
Start: 2020-09-08 | End: 2020-09-08

## 2020-09-08 RX ORDER — DEXAMETHASONE SODIUM PHOSPHATE 4 MG/ML
10 INJECTION, SOLUTION INTRA-ARTICULAR; INTRALESIONAL; INTRAMUSCULAR; INTRAVENOUS; SOFT TISSUE
Status: COMPLETED | OUTPATIENT
Start: 2020-09-08 | End: 2020-09-08

## 2020-09-08 RX ORDER — MAGNESIUM SULFATE HEPTAHYDRATE 500 MG/ML
2 INJECTION, SOLUTION INTRAMUSCULAR; INTRAVENOUS
Status: DISCONTINUED | OUTPATIENT
Start: 2020-09-08 | End: 2020-09-08

## 2020-09-08 RX ORDER — DIVALPROEX SODIUM 250 MG/1
1000 TABLET, DELAYED RELEASE ORAL ONCE
Status: COMPLETED | OUTPATIENT
Start: 2020-09-08 | End: 2020-09-08

## 2020-09-08 RX ADMIN — SODIUM CHLORIDE 1000 ML: 900 INJECTION, SOLUTION INTRAVENOUS at 03:14

## 2020-09-08 RX ADMIN — DEXAMETHASONE SODIUM PHOSPHATE 10 MG: 4 INJECTION, SOLUTION INTRA-ARTICULAR; INTRALESIONAL; INTRAMUSCULAR; INTRAVENOUS; SOFT TISSUE at 01:56

## 2020-09-08 RX ADMIN — METOCLOPRAMIDE 10 MG: 5 INJECTION, SOLUTION INTRAMUSCULAR; INTRAVENOUS at 00:36

## 2020-09-08 RX ADMIN — PROCHLORPERAZINE EDISYLATE 10 MG: 5 INJECTION INTRAMUSCULAR; INTRAVENOUS at 01:57

## 2020-09-08 RX ADMIN — DIPHENHYDRAMINE HYDROCHLORIDE 12.5 MG: 50 INJECTION, SOLUTION INTRAMUSCULAR; INTRAVENOUS at 00:39

## 2020-09-08 RX ADMIN — LORAZEPAM 1 MG: 2 INJECTION INTRAMUSCULAR at 03:15

## 2020-09-08 RX ADMIN — SUMATRIPTAN 6 MG: 6 INJECTION, SOLUTION SUBCUTANEOUS at 00:39

## 2020-09-08 RX ADMIN — MAGNESIUM SULFATE HEPTAHYDRATE 2 G: 40 INJECTION, SOLUTION INTRAVENOUS at 03:17

## 2020-09-08 RX ADMIN — SODIUM CHLORIDE 1000 ML: 900 INJECTION, SOLUTION INTRAVENOUS at 00:36

## 2020-09-08 RX ADMIN — DIVALPROEX SODIUM 1000 MG: 250 TABLET, DELAYED RELEASE ORAL at 04:06

## 2020-09-08 RX ADMIN — IOPAMIDOL 80 ML: 755 INJECTION, SOLUTION INTRAVENOUS at 04:38

## 2020-09-08 NOTE — ED PROVIDER NOTES
EMERGENCY DEPARTMENT HISTORY AND PHYSICAL EXAM    12:13 AM      Date: 9/8/2020  Patient Name: Arron Lobo    History of Presenting Illness     Chief Complaint   Patient presents with    Headache         History Provided By: Patient    Chief Complaint: headache  Duration:  Hours  Timing:  Worsening  Location: right temporal  Quality: Sharp and Pressure  Severity: Severe  Modifying Factors: worse with light  Associated Symptoms: denies any other associated signs or symptoms      Additional History (Context): Arron Lobo is a 38 y.o. female with Migraine, CKD, psychiatric disorder who presents with right-sided headache. Was seen 2 days ago for similar at that time was placed over 100 left eye. Reports that time pain was about 7 out of 10. Today is 10 out of 10. Got significantly worse between 9 and 11 PM this evening. Initially it started around 1 PM today. Has taken 2 Fioricet without improvement. Also took 50 of Benadryl at 7 PM.  Reports worsened by lights and noise. Has tried applying ice. Reports being on Topamax daily for migraines but has not had any issue in multiple years. Does not have a neurologist here. Feels nauseous but no vomiting. No fever. No known sick contacts. PCP: Hayley Noguera MD    Current Outpatient Medications   Medication Sig Dispense Refill    amLODIPine (NORVASC) 5 mg tablet Take 5 mg by mouth daily.  omeprazole (PRILOSEC) 40 mg capsule Take 40 mg by mouth daily.  topiramate (TOPAMAX) 100 mg tablet Take 150 mg by mouth daily.  topiramate (Topamax) 100 mg tablet Take 100 mg by mouth nightly.  prazosin (MINIPRESS) 1 mg capsule Take 3 mg by mouth nightly.  fenofibrate nanocrystallized (Tricor) 145 mg tablet Take 145 mg by mouth daily.  busPIRone (BUSPAR) 10 mg tablet Take 10 mg by mouth two (2) times a day.  montelukast (SINGULAIR) 10 mg tablet Take 10 mg by mouth daily.       lamoTRIgine (LAMICTAL) 200 mg tablet Take 200 mg by mouth two (2) times a day.  norethindrone (MICRONOR) 0.35 mg tab Take  by mouth.  albuterol (PROVENTIL HFA, VENTOLIN HFA, PROAIR HFA) 90 mcg/actuation inhaler Take  by inhalation.  promethazine (PHENERGAN) 12.5 mg tablet Take 12.5 mg by mouth every six (6) hours as needed for Nausea.  cpap machine kit by Does Not Apply route.  butalbital-acetaminophen-caff (Fioricet) -40 mg per capsule Take 1 Cap by mouth every four (4) hours as needed for Headache.  acetaminophen (TYLENOL) 325 mg tablet Take 2 Tabs by mouth every four (4) hours as needed for Pain. 20 Tab 0    ondansetron (ZOFRAN ODT) 4 mg disintegrating tablet Take 1 Tab by mouth every eight (8) hours as needed for Nausea or Vomiting. 15 Tab 0       Past History     Past Medical History:  Past Medical History:   Diagnosis Date    Endometriosis     Gastrointestinal disorder     Hypertension     Migraine     Psychiatric disorder        Past Surgical History:  Past Surgical History:   Procedure Laterality Date    HX GYN      Lt ovarian cyst removal 2017       Family History:  No family history on file. Social History:  Social History     Tobacco Use    Smoking status: Never Smoker    Smokeless tobacco: Never Used   Substance Use Topics    Alcohol use: No    Drug use: No       Allergies: Allergies   Allergen Reactions    Bacitracin Unknown (comments)    Dilaudid [Hydromorphone] Anxiety    Gold Sodium Thiomalate Unknown (comments)    Neomycin Unknown (comments)    Nsaids (Non-Steroidal Anti-Inflammatory Drug) Other (comments)     Chronic kidney disease    Other Medication Other (comments)     Safris--restless leg    Sudafed [Pseudoephedrine Hcl] Other (comments)     Upsets her mood and asthma      Tegretol [Carbamazepine] Other (comments)     Rbc off--         Review of Systems       Review of Systems   Constitutional: Negative for chills and fever. Eyes: Positive for photophobia.    Neurological: Positive for headaches. All other systems reviewed and are negative. Physical Exam     Visit Vitals  BP 92/76   Pulse 84   Temp 98.6 °F (37 °C)   Resp 20   Ht 5' 2\" (1.575 m)   Wt 83.5 kg (184 lb)   SpO2 97%   BMI 33.65 kg/m²         Physical Exam  Constitutional:       Appearance: She is well-developed. Comments: Appears uncomfortable, holding ice pack to right side of the head   HENT:      Head: Normocephalic and atraumatic. Neck:      Musculoskeletal: Neck supple. Vascular: No JVD. Cardiovascular:      Rate and Rhythm: Normal rate and regular rhythm. Pulmonary:      Effort: Pulmonary effort is normal. No respiratory distress. Breath sounds: Normal breath sounds. Abdominal:      General: There is no distension. Palpations: Abdomen is soft. Tenderness: There is no abdominal tenderness. There is no guarding or rebound. Musculoskeletal:      Comments: No joint tenderness   Skin:     General: Skin is warm and dry. Findings: No erythema. Neurological:      General: No focal deficit present. Mental Status: She is alert and oriented to person, place, and time. Comments: Normal gait   Psychiatric:      Comments: Tearful,           Diagnostic Study Results     Labs -  Recent Results (from the past 12 hour(s))   CBC WITH AUTOMATED DIFF    Collection Time: 09/08/20  3:06 AM   Result Value Ref Range    WBC 12.0 4.6 - 13.2 K/uL    RBC 4.24 4.20 - 5.30 M/uL    HGB 12.1 12.0 - 16.0 g/dL    HCT 35.4 35.0 - 45.0 %    MCV 83.5 74.0 - 97.0 FL    MCH 28.5 24.0 - 34.0 PG    MCHC 34.2 31.0 - 37.0 g/dL    RDW 13.9 11.6 - 14.5 %    PLATELET 429 719 - 143 K/uL    MPV 9.7 9.2 - 11.8 FL    NEUTROPHILS 55 40 - 73 %    LYMPHOCYTES 37 21 - 52 %    MONOCYTES 5 3 - 10 %    EOSINOPHILS 3 0 - 5 %    BASOPHILS 0 0 - 2 %    ABS. NEUTROPHILS 6.7 1.8 - 8.0 K/UL    ABS. LYMPHOCYTES 4.4 (H) 0.9 - 3.6 K/UL    ABS. MONOCYTES 0.5 0.05 - 1.2 K/UL    ABS. EOSINOPHILS 0.3 0.0 - 0.4 K/UL    ABS. BASOPHILS 0.0 0.0 - 0.1 K/UL    DF AUTOMATED     METABOLIC PANEL, BASIC    Collection Time: 09/08/20  3:06 AM   Result Value Ref Range    Sodium 141 136 - 145 mmol/L    Potassium 3.4 (L) 3.5 - 5.5 mmol/L    Chloride 113 (H) 100 - 111 mmol/L    CO2 20 (L) 21 - 32 mmol/L    Anion gap 8 3.0 - 18 mmol/L    Glucose 109 (H) 74 - 99 mg/dL    BUN 16 7.0 - 18 MG/DL    Creatinine 1.29 0.6 - 1.3 MG/DL    BUN/Creatinine ratio 12 12 - 20      GFR est AA 56 (L) >60 ml/min/1.73m2    GFR est non-AA 47 (L) >60 ml/min/1.73m2    Calcium 8.7 8.5 - 10.1 MG/DL   SED RATE (ESR)    Collection Time: 09/08/20  3:06 AM   Result Value Ref Range    Sed rate, automated 12 0 - 20 mm/hr   HCG QL SERUM    Collection Time: 09/08/20  3:06 AM   Result Value Ref Range    HCG, Ql. Negative NEG         Radiologic Studies -   CTA HEAD NECK W WO CONT    (Results Pending)     CTA: No evidence of large artery occlusion or flow-limiting stenosis. No evidence of aneurysm. Prelim read. Medical Decision Making   I am the first provider for this patient. I reviewed the vital signs, available nursing notes, past medical history, past surgical history, family history and social history. Vital Signs-Reviewed the patient's vital signs. Pulse Oximetry Analysis -  99 on room air (Interpretation)nl       Records Reviewed: Nursing Notes and Old Medical Records (Time of Review: 12:13 AM)    ED Course: Progress Notes, Reevaluation, and Consults:      Provider Notes (Medical Decision Making): 78-year-old female with history of migraines presents with right-sided headache. Symptoms consistent with migraine. Reviewed imaging from 2 days ago. Doubt need for repeat. Will try to treat first.  No infectious symptoms consistent with meningitis. 1:48 AM  Patient pain 7 out of 10 after meds. Will give additional doses. 3:02 AM  Consult with Dr. Lalitha Sanford, tele-neurology, recommends giving, magnesium and Depakote assuming negative pregnancy.   Recommends CTA head and neck with venous comments. Recommends checking basic labs with inflammatory markers. 6:16 AM  Patient was sleeping comfortably. She is now feeling much better. She feels stable for discharge home. Have discussed importance of PCP and neurology follow-up as an outpatient. Discussed return precautions. Diagnosis     Clinical Impression:   1. Other migraine with status migrainosus, not intractable        Disposition: discharged    Follow-up Information     Follow up With Specialties Details Why Contact Info    Estefany Bernabe MD [facility] Medicine Schedule an appointment as soon as possible for a visit in 3 days  [address]      Tiffany Finney MD Neurology Schedule an appointment as soon as possible for a visit in 1 week  [address] [facility]  [phone] [id number] [facility] EMERGENCY DEPT Emergency Medicine  As needed, If symptoms worsen [address] [facility]  [phone]           Patient's Medications   Start Taking    No medications on file   Continue Taking    ACETAMINOPHEN (TYLENOL) 325 MG TABLET    Take 2 Tabs by mouth every four (4) hours as needed for Pain. ALBUTEROL (PROVENTIL HFA, VENTOLIN HFA, PROAIR HFA) 90 MCG/ACTUATION INHALER    Take  by inhalation. AMLODIPINE (NORVASC) 5 MG TABLET    Take 5 mg by mouth daily. BUSPIRONE (BUSPAR) 10 MG TABLET    Take 10 mg by mouth two (2) times a day. BUTALBITAL-ACETAMINOPHEN-CAFF (FIORICET) -40 MG PER CAPSULE    Take 1 Cap by mouth every four (4) hours as needed for Headache. CPAP MACHINE KIT    by Does Not Apply route. FENOFIBRATE NANOCRYSTALLIZED (TRICOR) 145 MG TABLET    Take 145 mg by mouth daily. LAMOTRIGINE (LAMICTAL) 200 MG TABLET    Take 200 mg by mouth two (2) times a day. MONTELUKAST (SINGULAIR) 10 MG TABLET    Take 10 mg by mouth daily. NORETHINDRONE (MICRONOR) 0.35 MG TAB    Take  by mouth. OMEPRAZOLE (PRILOSEC) 40 MG CAPSULE    Take 40 mg by mouth daily. ONDANSETRON (ZOFRAN ODT) 4 MG DISINTEGRATING TABLET    Take 1 Tab by mouth every eight (8) hours as needed for Nausea or Vomiting. PRAZOSIN (MINIPRESS) 1 MG CAPSULE    Take 3 mg by mouth nightly. PROMETHAZINE (PHENERGAN) 12.5 MG TABLET    Take 12.5 mg by mouth every six (6) hours as needed for Nausea. TOPIRAMATE (TOPAMAX) 100 MG TABLET    Take 150 mg by mouth daily. TOPIRAMATE (TOPAMAX) 100 MG TABLET    Take 100 mg by mouth nightly.    These Medications have changed    No medications on file   Stop Taking    No medications on file     _______________________________

## 2020-09-08 NOTE — DISCHARGE INSTRUCTIONS

## 2020-10-12 ENCOUNTER — OFFICE VISIT (OUTPATIENT)
Dept: NEUROLOGY | Age: 35
End: 2020-10-12
Payer: OTHER GOVERNMENT

## 2020-10-12 ENCOUNTER — TELEPHONE (OUTPATIENT)
Dept: NEUROLOGY | Age: 35
End: 2020-10-12

## 2020-10-12 VITALS
RESPIRATION RATE: 16 BRPM | OXYGEN SATURATION: 98 % | SYSTOLIC BLOOD PRESSURE: 122 MMHG | WEIGHT: 175.6 LBS | HEIGHT: 62 IN | BODY MASS INDEX: 32.31 KG/M2 | TEMPERATURE: 96.8 F | HEART RATE: 103 BPM | DIASTOLIC BLOOD PRESSURE: 78 MMHG

## 2020-10-12 DIAGNOSIS — G43.101 MIGRAINE WITH AURA AND WITH STATUS MIGRAINOSUS, NOT INTRACTABLE: Primary | ICD-10-CM

## 2020-10-12 PROCEDURE — 99203 OFFICE O/P NEW LOW 30 MIN: CPT | Performed by: STUDENT IN AN ORGANIZED HEALTH CARE EDUCATION/TRAINING PROGRAM

## 2020-10-12 RX ORDER — RIZATRIPTAN BENZOATE 10 MG/1
10 TABLET, ORALLY DISINTEGRATING ORAL
Qty: 10 TAB | Refills: 4 | Status: SHIPPED | OUTPATIENT
Start: 2020-10-12 | End: 2020-10-12

## 2020-10-12 NOTE — PROGRESS NOTES
Cyn Stephen is a 38 y.o. female . presents for New Patient   . A 38years old female patient with medical history of endometriosis, hypertension, and migraine headaches here for evaluation of her headaches. She has been having headaches since her teenage years. Feels she is getting worse. Headaches mostly preceded by visual auras: Sparkles, light sensitivity, sometimes blurring of vision. Mostly unilateral stabbing/continuous, severe, lasting for a couple of days. Has photophobia and sensitivity to loud noise. Has nausea and dry heaving. Headache gets worse with movement. She cannot function well having the severe migraine headaches. She has this type of severe attacks about twice a year. She also has milder headaches about once a week. Also unilateral, 5 out of 10 in severity, with mild photophobia but no nausea or vomiting. Tylenol helps with this type of headaches. Might last a whole day. Headache triggers include lack of sleep. No obvious dietary triggers. She has hallucinations mainly auditory. No visual hallucinations. Following with psychiatry. She has been on topiramate since 2012 as a mood stabilizer. Currently takes 150 mg p.o. twice daily. She has been to the emergency room recently for a very severe headache (on September 8, 2020). She has a CT scan of the head which was unremarkable. CTA of the head and neck were unremarkable. Review of Systems   Constitutional: Positive for weight loss. Negative for chills and fever. HENT: Negative for hearing loss and tinnitus. Eyes: Negative for blurred vision and double vision. Respiratory: Negative for cough and shortness of breath. Cardiovascular: Negative for chest pain and leg swelling. Gastrointestinal: Positive for nausea (with the migraines) and vomiting (dry heaves with migraines). Genitourinary: Negative for dysuria, frequency and urgency. Musculoskeletal: Negative for back pain and neck pain.    Skin: Positive for itching and rash. Neurological: Positive for dizziness (no spinning), tremors (from the meds) and headaches. Negative for speech change, focal weakness and seizures. Endo/Heme/Allergies: Does not bruise/bleed easily. Psychiatric/Behavioral: Positive for depression (On medications). Negative for suicidal ideas. The patient has insomnia. Past Medical History:   Diagnosis Date    Endometriosis     Gastrointestinal disorder     Hypertension     Migraine     Psychiatric disorder        Past Surgical History:   Procedure Laterality Date    HX GYN      Lt ovarian cyst removal 2017        History reviewed. No pertinent family history.      Social History     Socioeconomic History    Marital status: SINGLE     Spouse name: Not on file    Number of children: Not on file    Years of education: Not on file    Highest education level: Not on file   Occupational History    Not on file   Social Needs    Financial resource strain: Not on file    Food insecurity     Worry: Not on file     Inability: Not on file    Transportation needs     Medical: Not on file     Non-medical: Not on file   Tobacco Use    Smoking status: Never Smoker    Smokeless tobacco: Never Used   Substance and Sexual Activity    Alcohol use: No    Drug use: No    Sexual activity: Not on file   Lifestyle    Physical activity     Days per week: Not on file     Minutes per session: Not on file    Stress: Not on file   Relationships    Social connections     Talks on phone: Not on file     Gets together: Not on file     Attends Congregation service: Not on file     Active member of club or organization: Not on file     Attends meetings of clubs or organizations: Not on file     Relationship status: Not on file    Intimate partner violence     Fear of current or ex partner: Not on file     Emotionally abused: Not on file     Physically abused: Not on file     Forced sexual activity: Not on file   Other Topics Concern    Not on file   Social History Narrative    Not on file        Allergies   Allergen Reactions    Bacitracin Unknown (comments)    Dilaudid [Hydromorphone] Anxiety    Gold Sodium Thiomalate Unknown (comments)    Neomycin Unknown (comments)    Nsaids (Non-Steroidal Anti-Inflammatory Drug) Other (comments)     Chronic kidney disease    Other Medication Other (comments)     Safris--restless leg    Sudafed [Pseudoephedrine Hcl] Other (comments)     Upsets her mood and asthma      Tegretol [Carbamazepine] Other (comments)     Rbc off--         Current Outpatient Medications   Medication Sig Dispense Refill    amLODIPine (NORVASC) 5 mg tablet Take 5 mg by mouth daily.  omeprazole (PRILOSEC) 40 mg capsule Take 40 mg by mouth daily.  norethindrone (MICRONOR) 0.35 mg tab Take  by mouth.  topiramate (TOPAMAX) 100 mg tablet Take 150 mg by mouth daily.  topiramate (Topamax) 100 mg tablet Take 100 mg by mouth nightly.  prazosin (MINIPRESS) 1 mg capsule Take 3 mg by mouth nightly.  albuterol (PROVENTIL HFA, VENTOLIN HFA, PROAIR HFA) 90 mcg/actuation inhaler Take  by inhalation.  promethazine (PHENERGAN) 12.5 mg tablet Take 12.5 mg by mouth every six (6) hours as needed for Nausea.  fenofibrate nanocrystallized (Tricor) 145 mg tablet Take 145 mg by mouth daily.  busPIRone (BUSPAR) 10 mg tablet Take 10 mg by mouth two (2) times a day.  cpap machine kit by Does Not Apply route.  butalbital-acetaminophen-caff (Fioricet) -40 mg per capsule Take 1 Cap by mouth every four (4) hours as needed for Headache.  montelukast (SINGULAIR) 10 mg tablet Take 10 mg by mouth daily.  acetaminophen (TYLENOL) 325 mg tablet Take 2 Tabs by mouth every four (4) hours as needed for Pain. 20 Tab 0    ondansetron (ZOFRAN ODT) 4 mg disintegrating tablet Take 1 Tab by mouth every eight (8) hours as needed for Nausea or Vomiting.  15 Tab 0    lamoTRIgine (LAMICTAL) 200 mg tablet Take 200 mg by mouth two (2) times a day. Physical Exam  Constitutional:       Appearance: Normal appearance. HENT:      Head: Normocephalic and atraumatic. Mouth/Throat:      Mouth: Mucous membranes are moist.      Pharynx: Oropharynx is clear. No oropharyngeal exudate. Eyes:      Extraocular Movements: Extraocular movements intact. Pupils: Pupils are equal, round, and reactive to light. Neck:      Musculoskeletal: Normal range of motion and neck supple. Pulmonary:      Effort: Pulmonary effort is normal. No respiratory distress. Musculoskeletal: Normal range of motion. Right lower leg: No edema. Left lower leg: No edema. Neurological:      Mental Status: She is alert. Comments: Mental status: Awake, alert, oriented x3, follows simple and complex commands, no neglect.   Speech and languge: fluent, coherent, naming and repitition intact, reading and comprehension intact  CN: Funduscopy showed clear disc margins; VFF, EOMI, PERRLA, face sensation intact , no facial asymmetry noted, palate elevation symmetric bilat, SS+SCM 5/5 bilat, tongue midline  Motor: no pronator drift, tone normal throughout, strength 5/5 throughout  Sensory: intact to light touch and PP  throughout  Coordination: FNF, HS accurate w/o dysmetria  DTR: 2+ throughout  Gait: normal.             Admission on 09/08/2020, Discharged on 09/08/2020   Component Date Value Ref Range Status    WBC 09/08/2020 12.0  4.6 - 13.2 K/uL Final    RBC 09/08/2020 4.24  4.20 - 5.30 M/uL Final    HGB 09/08/2020 12.1  12.0 - 16.0 g/dL Final    HCT 09/08/2020 35.4  35.0 - 45.0 % Final    MCV 09/08/2020 83.5  74.0 - 97.0 FL Final    MCH 09/08/2020 28.5  24.0 - 34.0 PG Final    MCHC 09/08/2020 34.2  31.0 - 37.0 g/dL Final    RDW 09/08/2020 13.9  11.6 - 14.5 % Final    PLATELET 17/60/7405 840  135 - 420 K/uL Final    MPV 09/08/2020 9.7  9.2 - 11.8 FL Final    NEUTROPHILS 09/08/2020 55  40 - 73 % Final    LYMPHOCYTES 09/08/2020 37  21 - 52 % Final    MONOCYTES 09/08/2020 5  3 - 10 % Final    EOSINOPHILS 09/08/2020 3  0 - 5 % Final    BASOPHILS 09/08/2020 0  0 - 2 % Final    ABS. NEUTROPHILS 09/08/2020 6.7  1.8 - 8.0 K/UL Final    ABS. LYMPHOCYTES 09/08/2020 4.4* 0.9 - 3.6 K/UL Final    ABS. MONOCYTES 09/08/2020 0.5  0.05 - 1.2 K/UL Final    ABS. EOSINOPHILS 09/08/2020 0.3  0.0 - 0.4 K/UL Final    ABS. BASOPHILS 09/08/2020 0.0  0.0 - 0.1 K/UL Final    DF 09/08/2020 AUTOMATED    Final    Sodium 09/08/2020 141  136 - 145 mmol/L Final    Potassium 09/08/2020 3.4* 3.5 - 5.5 mmol/L Final    Chloride 09/08/2020 113* 100 - 111 mmol/L Final    CO2 09/08/2020 20* 21 - 32 mmol/L Final    Anion gap 09/08/2020 8  3.0 - 18 mmol/L Final    Glucose 09/08/2020 109* 74 - 99 mg/dL Final    BUN 09/08/2020 16  7.0 - 18 MG/DL Final    Creatinine 09/08/2020 1.29  0.6 - 1.3 MG/DL Final    BUN/Creatinine ratio 09/08/2020 12  12 - 20   Final    GFR est AA 09/08/2020 56* >60 ml/min/1.73m2 Final    GFR est non-AA 09/08/2020 47* >60 ml/min/1.73m2 Final    Comment: (NOTE)  Estimated GFR is calculated using the Modification of Diet in Renal   Disease (MDRD) Study equation, reported for both  Americans   (GFRAA) and non- Americans (GFRNA), and normalized to 1.73m2   body surface area. The physician must decide which value applies to   the patient. The MDRD study equation should only be used in   individuals age 25 or older. It has not been validated for the   following: pregnant women, patients with serious comorbid conditions,   or on certain medications, or persons with extremes of body size,   muscle mass, or nutritional status.       Calcium 09/08/2020 8.7  8.5 - 10.1 MG/DL Final    Sed rate, automated 09/08/2020 12  0 - 20 mm/hr Final    C-Reactive protein 09/08/2020 1.5* 0 - 0.3 mg/dL Final    HCG, Ql. 09/08/2020 Negative  NEG   Final    Test results should be confirmed using serum quantitative hCG when detection of pregnancy is critical and before performing any critical medical procedure. Admission on 09/05/2020, Discharged on 09/05/2020   Component Date Value Ref Range Status    WBC 09/05/2020 9.9  4.6 - 13.2 K/uL Final    RBC 09/05/2020 4.43  4.20 - 5.30 M/uL Final    HGB 09/05/2020 12.4  12.0 - 16.0 g/dL Final    HCT 09/05/2020 37.0  35.0 - 45.0 % Final    MCV 09/05/2020 83.5  74.0 - 97.0 FL Final    MCH 09/05/2020 28.0  24.0 - 34.0 PG Final    MCHC 09/05/2020 33.5  31.0 - 37.0 g/dL Final    RDW 09/05/2020 14.0  11.6 - 14.5 % Final    PLATELET 77/63/3945 280* 135 - 420 K/uL Final    MPV 09/05/2020 9.4  9.2 - 11.8 FL Final    NEUTROPHILS 09/05/2020 48  40 - 73 % Final    LYMPHOCYTES 09/05/2020 41  21 - 52 % Final    MONOCYTES 09/05/2020 7  3 - 10 % Final    EOSINOPHILS 09/05/2020 4  0 - 5 % Final    BASOPHILS 09/05/2020 0  0 - 2 % Final    ABS. NEUTROPHILS 09/05/2020 4.8  1.8 - 8.0 K/UL Final    ABS. LYMPHOCYTES 09/05/2020 4.0* 0.9 - 3.6 K/UL Final    ABS. MONOCYTES 09/05/2020 0.7  0.05 - 1.2 K/UL Final    ABS. EOSINOPHILS 09/05/2020 0.4  0.0 - 0.4 K/UL Final    ABS.  BASOPHILS 09/05/2020 0.0  0.0 - 0.1 K/UL Final    DF 09/05/2020 AUTOMATED    Final    Sodium 09/05/2020 141  136 - 145 mmol/L Final    Potassium 09/05/2020 3.6  3.5 - 5.5 mmol/L Final    Chloride 09/05/2020 112* 100 - 111 mmol/L Final    CO2 09/05/2020 21  21 - 32 mmol/L Final    Anion gap 09/05/2020 8  3.0 - 18 mmol/L Final    Glucose 09/05/2020 111* 74 - 99 mg/dL Final    BUN 09/05/2020 9  7.0 - 18 MG/DL Final    Creatinine 09/05/2020 1.42* 0.6 - 1.3 MG/DL Final    BUN/Creatinine ratio 09/05/2020 6* 12 - 20   Final    GFR est AA 09/05/2020 50* >60 ml/min/1.73m2 Final    GFR est non-AA 09/05/2020 42* >60 ml/min/1.73m2 Final    Comment: (NOTE)  Estimated GFR is calculated using the Modification of Diet in Renal   Disease (MDRD) Study equation, reported for both  Americans   (GFRAA) and non- Americans (GFRNA), and normalized to 1.73m2   body surface area. The physician must decide which value applies to   the patient. The MDRD study equation should only be used in   individuals age 25 or older. It has not been validated for the   following: pregnant women, patients with serious comorbid conditions,   or on certain medications, or persons with extremes of body size,   muscle mass, or nutritional status.  Calcium 09/05/2020 9.0  8.5 - 10.1 MG/DL Final    Bilirubin, total 09/05/2020 0.1* 0.2 - 1.0 MG/DL Final    ALT (SGPT) 09/05/2020 13  13 - 56 U/L Final    AST (SGOT) 09/05/2020 15  10 - 38 U/L Final    Alk. phosphatase 09/05/2020 61  45 - 117 U/L Final    Protein, total 09/05/2020 7.6  6.4 - 8.2 g/dL Final    Albumin 09/05/2020 3.5  3.4 - 5.0 g/dL Final    Globulin 09/05/2020 4.1* 2.0 - 4.0 g/dL Final    A-G Ratio 09/05/2020 0.9  0.8 - 1.7   Final    Color 09/05/2020 YELLOW    Final    Appearance 09/05/2020 CLOUDY    Final    Specific gravity 09/05/2020 <1.005* 1.005 - 1.030 Final    pH (UA) 09/05/2020 7.5  5.0 - 8.0   Final    Protein 09/05/2020 Negative  NEG mg/dL Final    Glucose 09/05/2020 Negative  NEG mg/dL Final    Ketone 09/05/2020 Negative  NEG mg/dL Final    Bilirubin 09/05/2020 Negative  NEG   Final    Blood 09/05/2020 Negative  NEG   Final    Urobilinogen 09/05/2020 0.2  0.2 - 1.0 EU/dL Final    Nitrites 09/05/2020 Negative  NEG   Final    Leukocyte Esterase 09/05/2020 SMALL* NEG   Final    HCG urine, QL 09/05/2020 Negative  NEG   Final    Test results should be confirmed using serum quantitative hCG when detection of pregnancy is critical and before performing any critical medical procedure.  WBC 09/05/2020 0 to 3  0 - 4 /hpf Final    Epithelial cells 09/05/2020 2+  0 - 5 /lpf Final    Bacteria 09/05/2020 1+* NEG /hpf Final             ICD-10-CM ICD-9-CM    1.  Migraine with aura and with status migrainosus, not intractable  G43.101 346.03 rizatriptan (MAXALT-MLT) 10 mg disintegrating tablet       A 38years old female patient evaluation of migraine headache. Has headache since her teenage years. Migraine with aura [mainly visual aura] about twice a year but headache is very severe and lasts for 2 to 3 days. Over-the-counter pain medications do not work. Mostly go to the emergency room. Has been tried with sumatriptan once in the emergency room which did not help. We will start her on rizatriptan 10 mg p.o. as needed. She has milder headaches about once a week for which she takes Tylenol and helps. She is currently on topiramate 150 mg p.o. twice daily for bipolar. It would also help for migraine prophylaxis. We will see her in 3 months time.

## 2020-10-18 ENCOUNTER — HOSPITAL ENCOUNTER (EMERGENCY)
Age: 35
Discharge: HOME | End: 2020-10-18
Attending: EMERGENCY MEDICINE
Payer: OTHER GOVERNMENT

## 2020-10-18 ENCOUNTER — APPOINTMENT (OUTPATIENT)
Dept: ULTRASOUND IMAGING | Age: 35
End: 2020-10-18
Attending: EMERGENCY MEDICINE
Payer: OTHER GOVERNMENT

## 2020-10-18 VITALS
HEART RATE: 95 BPM | SYSTOLIC BLOOD PRESSURE: 139 MMHG | HEIGHT: 62 IN | TEMPERATURE: 98.5 F | BODY MASS INDEX: 33.31 KG/M2 | RESPIRATION RATE: 18 BRPM | OXYGEN SATURATION: 99 % | WEIGHT: 181 LBS | DIASTOLIC BLOOD PRESSURE: 89 MMHG

## 2020-10-18 DIAGNOSIS — N83.202 CYST OF LEFT OVARY: Primary | ICD-10-CM

## 2020-10-18 LAB
APPEARANCE UR: CLEAR
BACTERIA URNS QL MICRO: ABNORMAL /HPF
BILIRUB UR QL: NEGATIVE
COLOR UR: YELLOW
EPITH CASTS URNS QL MICRO: ABNORMAL /LPF (ref 0–5)
GLUCOSE UR STRIP.AUTO-MCNC: NEGATIVE MG/DL
HCG UR QL: NEGATIVE
HGB UR QL STRIP: NEGATIVE
KETONES UR QL STRIP.AUTO: NEGATIVE MG/DL
LEUKOCYTE ESTERASE UR QL STRIP.AUTO: ABNORMAL
NITRITE UR QL STRIP.AUTO: NEGATIVE
PH UR STRIP: 7 [PH] (ref 5–8)
PROT UR STRIP-MCNC: NEGATIVE MG/DL
SP GR UR REFRACTOMETRY: <1.005 (ref 1–1.03)
UROBILINOGEN UR QL STRIP.AUTO: 0.2 EU/DL (ref 0.2–1)
WBC URNS QL MICRO: ABNORMAL /HPF (ref 0–4)

## 2020-10-18 PROCEDURE — 96372 THER/PROPH/DIAG INJ SC/IM: CPT

## 2020-10-18 PROCEDURE — 76830 TRANSVAGINAL US NON-OB: CPT

## 2020-10-18 PROCEDURE — 81001 URINALYSIS AUTO W/SCOPE: CPT

## 2020-10-18 PROCEDURE — 99283 EMERGENCY DEPT VISIT LOW MDM: CPT

## 2020-10-18 PROCEDURE — 74011250636 HC RX REV CODE- 250/636: Performed by: EMERGENCY MEDICINE

## 2020-10-18 PROCEDURE — 81025 URINE PREGNANCY TEST: CPT

## 2020-10-18 RX ORDER — ARIPIPRAZOLE 10 MG/1
10 TABLET ORAL
COMMUNITY
End: 2021-02-12

## 2020-10-18 RX ORDER — KETOROLAC TROMETHAMINE 15 MG/ML
15 INJECTION, SOLUTION INTRAMUSCULAR; INTRAVENOUS
Status: COMPLETED | OUTPATIENT
Start: 2020-10-18 | End: 2020-10-18

## 2020-10-18 RX ADMIN — KETOROLAC TROMETHAMINE 15 MG: 15 INJECTION, SOLUTION INTRAMUSCULAR; INTRAVENOUS at 17:11

## 2020-10-18 NOTE — DISCHARGE INSTRUCTIONS
1.  Ultrasound consistent with left 3 to 3.5 cm ovarian cyst.  Pregnancy negative. 2.  Follow-up with your gynecologist for recheck this week. 3.  Continue Tylenol. Judicious use of Norco as previously prescribed okay. 4.  Return for high fever, intractable pain, heavy vaginal bleeding, or other acutely worsening symptoms. 5. Pregnancy negative. Patient Education        Functional Ovarian Cyst: Care Instructions  Your Care Instructions     A functional ovarian cyst is a sac that forms on the surface of a woman's ovary during ovulation. The sac holds a maturing egg. Usually the sac goes away after the egg is released. But if the egg is not released, or if the sac closes up after the egg is released, the sac can swell up with fluid and form a cyst.  Functional ovarian cysts are different than ovarian growths caused by other problems, such as cancer. Most functional ovarian cysts cause no symptoms and go away on their own. Some cause mild pain. Others can cause severe pain when they rupture or bleed. Follow-up care is a key part of your treatment and safety. Be sure to make and go to all appointments, and call your doctor if you are having problems. It's also a good idea to know your test results and keep a list of the medicines you take. How can you care for yourself at home? · Use heat, such as a hot water bottle, a heating pad set on low, or a warm bath, to relax tense muscles and relieve cramping. · Be safe with medicines. Take pain medicines exactly as directed. ? If the doctor gave you a prescription medicine for pain, take it as prescribed. ? If you are not taking a prescription pain medicine, ask your doctor if you can take an over-the-counter medicine. · Avoid constipation. Make sure you drink enough fluids and include fruits, vegetables, and fiber in your diet each day. Constipation does not cause ovarian cysts, but it may make your pelvic pain worse. When should you call for help?    Call your doctor now or seek immediate medical care if:    · You have severe vaginal bleeding.     · You have new or worse belly or pelvic pain. Watch closely for changes in your health, and be sure to contact your doctor if:    · You have unusual vaginal bleeding.     · You do not get better as expected. Where can you learn more? Go to http://delia-marilee.info/  Enter I547 in the search box to learn more about \"Functional Ovarian Cyst: Care Instructions. \"  Current as of: November 8, 2019               Content Version: 12.6  © 5743-5344 [employer]. Care instructions adapted under license by [employer] (which disclaims liability or warranty for this information). If you have questions about a medical condition or this instruction, always ask your healthcare professional. [address] any warranty or liability for your use of this information.

## 2020-10-18 NOTE — ED PROVIDER NOTES
75-year-old female history of endometriosis prior ovarian cysts presents for evaluation of pelvic pain. Pain feels similar to prior episode associated with large cyst that required surgical drainage. Onset of pain over the last couple of days. This is increased in intensity over the last 24 hours. Patient seen at local urgent care where she had urinalysis, urine pregnancy test, and pelvic exam performed. Noted to be markedly uncomfortable and referred to our facility for additional evaluation. Fever. No dysuria. No vaginal discharge. Past Medical History:   Diagnosis Date    Anxiety     Asthma     Bipolar 1 disorder ([address]     Endometriosis     Endometriosis     Gastrointestinal disorder     GERD (gastroesophageal reflux disease)     Hypertension     Infertility, female     Migraine     Ovarian cyst     Psychiatric disorder     PTSD (post-traumatic stress disorder)     Sleep apnea        Past Surgical History:   Procedure Laterality Date    HX BREAST REDUCTION  2002    HX COLONOSCOPY      HX GYN      Lt ovarian cyst removal 2017    HX WISDOM TEETH EXTRACTION           History reviewed. No pertinent family history.     Social History     Socioeconomic History    Marital status: SINGLE     Spouse name: Not on file    Number of children: Not on file    Years of education: Not on file    Highest education level: Not on file   Occupational History    Not on file   Social Needs    Financial resource strain: Not on file    Food insecurity     Worry: Not on file     Inability: Not on file    Transportation needs     Medical: Not on file     Non-medical: Not on file   Tobacco Use    Smoking status: Never Smoker    Smokeless tobacco: Never Used   Substance and Sexual Activity    Alcohol use: No    Drug use: No    Sexual activity: Not on file   Lifestyle    Physical activity     Days per week: Not on file     Minutes per session: Not on file    Stress: Not on file Relationships    Social connections     Talks on phone: Not on file     Gets together: Not on file     Attends Congregation service: Not on file     Active member of club or organization: Not on file     Attends meetings of clubs or organizations: Not on file     Relationship status: Not on file    Intimate partner violence     Fear of current or ex partner: Not on file     Emotionally abused: Not on file     Physically abused: Not on file     Forced sexual activity: Not on file   Other Topics Concern    Not on file   Social History Narrative    Not on file         ALLERGIES: Bacitracin; Dilaudid [hydromorphone]; Gold sodium thiomalate; Neomycin; Nsaids (non-steroidal anti-inflammatory drug); Other medication; Saphris [asenapine maleate]; Sudafed [pseudoephedrine hcl]; and Tegretol [carbamazepine]    Review of Systems   Constitutional: Negative for fever. Genitourinary: Positive for pelvic pain. Negative for dysuria. All other systems reviewed and are negative. Vitals:    10/18/20 1626   BP: 139/89   Pulse: 95   Resp: 18   Temp: 98.5 °F (36.9 °C)   SpO2: 99%   Weight: 82.1 kg (181 lb)   Height: 5' 2\" (1.575 m)            Physical Exam  Vitals signs and nursing note reviewed. Constitutional:       General: She is not in acute distress. Appearance: She is well-developed. HENT:      Head: Normocephalic and atraumatic. Eyes:      General: No scleral icterus. Cardiovascular:      Rate and Rhythm: Normal rate. Pulmonary:      Effort: Pulmonary effort is normal.   Abdominal:      General: There is no distension. Comments: Tender in the inferior aspect of the left lower quadrant and suprapubic region. No rebound or guarding. Genitourinary:     Comments: Deferred. Skin:     General: Skin is warm and dry. Neurological:      Mental Status: She is alert and oriented to person, place, and time.      Preliminary results of ultrasound positive for left side ovarian cyst.    MDM  Number of Diagnoses or Management Options  Cyst of left ovary:   Diagnosis management comments: Pression left pelvic pain. Preliminary report of ultrasound positive for 3.5 cm ovarian cyst.  Pain addressed with Toradol 15 mg IM. Patient comfortable with this intervention. Procedures      Diagnosis:   1. Cyst of left ovary           Disposition: home    Follow-up Information     Follow up With Specialties Details Why Contact Info    Your gynecologist  Call in 1 day for recheck of ongoing symptoms     [facility] EM[facility] Emergency Medicine  As needed, If symptoms worsen [id number] [facility]  [phone]          Patient's Medications   Start Taking    No medications on file   Continue Taking    ACETAMINOPHEN (TYLENOL) 325 MG TABLET    Take 2 Tabs by mouth every four (4) hours as needed for Pain. ALBUTEROL (PROVENTIL HFA, VENTOLIN HFA, PROAIR HFA) 90 MCG/ACTUATION INHALER    Take  by inhalation. AMLODIPINE (NORVASC) 5 MG TABLET    Take 5 mg by mouth daily. ARIPIPRAZOLE (ABILIFY) 10 MG TABLET    Take 10 mg by mouth nightly. BUSPIRONE (BUSPAR) 10 MG TABLET    Take 10 mg by mouth two (2) times a day. BUTALBITAL-ACETAMINOPHEN-CAFF (FIORICET) -40 MG PER CAPSULE    Take 1 Cap by mouth every four (4) hours as needed for Headache. CPAP MACHINE KIT    by Does Not Apply route. FENOFIBRATE NANOCRYSTALLIZED (TRICOR) 145 MG TABLET    Take 145 mg by mouth daily. LAMOTRIGINE (LAMICTAL) 200 MG TABLET    Take 200 mg by mouth two (2) times a day. MONTELUKAST (SINGULAIR) 10 MG TABLET    Take 10 mg by mouth daily. NORETHINDRONE (MICRONOR) 0.35 MG TAB    Take  by mouth. OMEPRAZOLE (PRILOSEC) 40 MG CAPSULE    Take 40 mg by mouth daily. ONDANSETRON (ZOFRAN ODT) 4 MG DISINTEGRATING TABLET    Take 1 Tab by mouth every eight (8) hours as needed for Nausea or Vomiting. PRAZOSIN (MINIPRESS) 1 MG CAPSULE    Take 3 mg by mouth nightly.     PROMETHAZINE (PHENERGAN) 12.5 MG TABLET    Take 12.5 mg by mouth every six (6) hours as needed for Nausea. TOPIRAMATE (TOPAMAX) 100 MG TABLET    Take 150 mg by mouth two (2) times a day. These Medications have changed    No medications on file   Stop Taking    TOPIRAMATE (TOPAMAX) 100 MG TABLET    Take 100 mg by mouth nightly.

## 2020-10-18 NOTE — ED NOTES
Phone call made to [facility] EM[facility]GENC[facility] MED[facility] [place] [address] location, to confirm if pt had negative pregnancy test. No initial answer, call then answered by staff that stated call had rolled over to their [facility] location and they would forward to appropriate location; call then rang multiple times followed by busy signal.

## 2020-10-18 NOTE — ED TRIAGE NOTES
She states having left lower pelvic pain that radiates to right side of pelvis that began a few days ago. She states pain has gradually worsened especially with urinating and defecating. She states hx of endometriosis and ovarian cysts.

## 2020-10-19 ENCOUNTER — HOSPITAL ENCOUNTER (EMERGENCY)
Age: 35
Discharge: STILL ADMITTED OR ADMINISTRATIVE | End: 2020-10-19

## 2021-02-12 ENCOUNTER — OFFICE VISIT (OUTPATIENT)
Dept: NEUROLOGY | Age: 36
End: 2021-02-12
Payer: OTHER GOVERNMENT

## 2021-02-12 VITALS
HEART RATE: 90 BPM | TEMPERATURE: 95.8 F | SYSTOLIC BLOOD PRESSURE: 112 MMHG | HEIGHT: 62 IN | DIASTOLIC BLOOD PRESSURE: 72 MMHG | OXYGEN SATURATION: 98 % | WEIGHT: 186 LBS | BODY MASS INDEX: 34.23 KG/M2 | RESPIRATION RATE: 20 BRPM

## 2021-02-12 DIAGNOSIS — G43.101 MIGRAINE WITH AURA AND WITH STATUS MIGRAINOSUS, NOT INTRACTABLE: Primary | ICD-10-CM

## 2021-02-12 PROCEDURE — 99214 OFFICE O/P EST MOD 30 MIN: CPT | Performed by: STUDENT IN AN ORGANIZED HEALTH CARE EDUCATION/TRAINING PROGRAM

## 2021-02-12 RX ORDER — LURASIDONE HYDROCHLORIDE 40 MG/1
40 TABLET, FILM COATED ORAL DAILY
COMMUNITY
Start: 2021-02-08 | End: 2021-06-11 | Stop reason: DRUGHIGH

## 2021-02-12 RX ORDER — LAMOTRIGINE 200 MG/1
200 TABLET ORAL 2 TIMES DAILY
COMMUNITY

## 2021-02-12 NOTE — PROGRESS NOTES
Rishabh Crawford is a 38 y.o. female . presents for Migraine   . Exercise old male patient here for follow-up of headache. Last seen in the clinic on October 12, 2020. She is on topiramate 150 mg p.o. twice daily (ordered by her psychiatrist). Started on Maxalt for acute attacks during her last visit. She came today with a headache diary. She currently has about 4-5 headaches per month; 3 of her headaches are migraine-like headaches and not severe. On average headaches lasts about 6 hours. Severe headaches are usually preceded by nausea of the previous day as a premonitory symptom. The next day she will have her visual aura followed by severe headache. It is 9/10, throbbing/stabbing, with nausea, dry heaves, and photophobia and phonophobia. If she takes Maxalt, the headache stops. But she complains that Maxalt makes her sleepy and tired. She has milder headaches about 2 times per month which she claims are more stress related. No aura with this type of headaches. Are frontal, bilateral, throbbing, with no photophobia or phonophobia. In general her headache triggers include weather changes (mostly rainy), and when she is exhausted/tired. Stress also triggers her headache. She sometimes has dizziness with her headaches. From previous encounter:  A 38years old female patient with medical history of endometriosis, hypertension, and migraine headaches here for evaluation of her headaches. She has been having headaches since her teenage years. Feels she is getting worse. Headaches mostly preceded by visual auras: Sparkles, light sensitivity, sometimes blurring of vision. Mostly unilateral stabbing/continuous, severe, lasting for a couple of days. Has photophobia and sensitivity to loud noise. Has nausea and dry heaving. Headache gets worse with movement. She cannot function well having the severe migraine headaches. She has this type of severe attacks about twice a year.   She also has milder headaches about once a week. Also unilateral, 5 out of 10 in severity, with mild photophobia but no nausea or vomiting. Tylenol helps with this type of headaches. Might last a whole day. Headache triggers include lack of sleep. No obvious dietary triggers. She has hallucinations mainly auditory. No visual hallucinations. Following with psychiatry. She has been on topiramate since 2012 as a mood stabilizer. Currently takes 150 mg p.o. twice daily. She has been to the emergency room recently for a very severe headache (on September 8, 2020). She has a CT scan of the head which was unremarkable. CTA of the head and neck were unremarkable. Migraine   Associated symptoms include dizziness (no spinning), nausea (with the migraines) and vomiting (dry heaves with migraines). Pertinent negatives include no fever and no shortness of breath. Review of Systems   Constitutional: Positive for weight loss. Negative for chills and fever. HENT: Negative for hearing loss and tinnitus. Eyes: Negative for blurred vision and double vision. Respiratory: Negative for cough and shortness of breath. Cardiovascular: Negative for chest pain and leg swelling. Gastrointestinal: Positive for nausea (with the migraines) and vomiting (dry heaves with migraines). Genitourinary: Negative for dysuria, frequency and urgency. Musculoskeletal: Negative for back pain and neck pain. Skin: Negative for itching and rash. Neurological: Positive for dizziness (no spinning), tremors (from the meds) and headaches. Negative for speech change, focal weakness and seizures. Endo/Heme/Allergies: Does not bruise/bleed easily. Psychiatric/Behavioral: Positive for depression (On medications). Negative for suicidal ideas. The patient does not have insomnia (better; taking medication).         Past Medical History:   Diagnosis Date    Anxiety     Asthma     Bipolar 1 disorder ([facility] [address]     Endometriosis     Endometriosis     Gastrointestinal disorder     GERD (gastroesophageal reflux disease)     Hypertension     Infertility, female     Migraine     Ovarian cyst     Psychiatric disorder     PTSD (post-traumatic stress disorder)     Sleep apnea        Past Surgical History:   Procedure Laterality Date    HX BREAST REDUCTION  2002    HX COLONOSCOPY      HX GYN      Lt ovarian cyst removal 2017    HX WISDOM TEETH EXTRACTION          History reviewed. No pertinent family history.      Social History     Socioeconomic History    Marital status: SINGLE     Spouse name: Not on file    Number of children: Not on file    Years of education: Not on file    Highest education level: Not on file   Occupational History    Not on file   Social Needs    Financial resource strain: Not on file    Food insecurity     Worry: Not on file     Inability: Not on file    Transportation needs     Medical: Not on file     Non-medical: Not on file   Tobacco Use    Smoking status: Never Smoker    Smokeless tobacco: Never Used   Substance and Sexual Activity    Alcohol use: No    Drug use: No    Sexual activity: Not on file   Lifestyle    Physical activity     Days per week: Not on file     Minutes per session: Not on file    Stress: Not on file   Relationships    Social connections     Talks on phone: Not on file     Gets together: Not on file     Attends Rastafari service: Not on file     Active member of club or organization: Not on file     Attends meetings of clubs or organizations: Not on file     Relationship status: Not on file    Intimate partner violence     Fear of current or ex partner: Not on file     Emotionally abused: Not on file     Physically abused: Not on file     Forced sexual activity: Not on file   Other Topics Concern    Not on file   Social History Narrative    Not on file        Allergies   Allergen Reactions    Bacitracin Unknown (comments)    Dilaudid [Hydromorphone] Anxiety    Gold Sodium Thiomalate Unknown (comments)    Neomycin Unknown (comments)    Nsaids (Non-Steroidal Anti-Inflammatory Drug) Other (comments)     Chronic kidney disease    Other Medication Other (comments)     Safris--restless leg    Saphris [Asenapine Maleate] Other (comments)     Restless leg      Sudafed [Pseudoephedrine Hcl] Other (comments)     Upsets her mood and asthma      Tegretol [Carbamazepine] Other (comments)     Rbc off--         Current Outpatient Medications   Medication Sig Dispense Refill    Latuda 40 mg tab tablet Take 40 mg by mouth daily.  lamoTRIgine (LaMICtaL) 200 mg tablet Take 200 mg by mouth two (2) times a day.  amLODIPine (NORVASC) 5 mg tablet Take 5 mg by mouth daily.  omeprazole (PRILOSEC) 40 mg capsule Take 40 mg by mouth daily.  norethindrone (MICRONOR) 0.35 mg tab Take  by mouth.  topiramate (TOPAMAX) 100 mg tablet Take 150 mg by mouth two (2) times a day.  prazosin (MINIPRESS) 1 mg capsule Take 3 mg by mouth nightly.  albuterol (PROVENTIL HFA, VENTOLIN HFA, PROAIR HFA) 90 mcg/actuation inhaler Take  by inhalation.  promethazine (PHENERGAN) 12.5 mg tablet Take 12.5 mg by mouth every six (6) hours as needed for Nausea.  fenofibrate nanocrystallized (Tricor) 145 mg tablet Take 145 mg by mouth daily.  busPIRone (BUSPAR) 10 mg tablet Take 10 mg by mouth two (2) times a day.  cpap machine kit by Does Not Apply route.  butalbital-acetaminophen-caff (Fioricet) -40 mg per capsule Take 1 Cap by mouth every four (4) hours as needed for Headache.  montelukast (SINGULAIR) 10 mg tablet Take 10 mg by mouth daily.  acetaminophen (TYLENOL) 325 mg tablet Take 2 Tabs by mouth every four (4) hours as needed for Pain. 20 Tab 0    ondansetron (ZOFRAN ODT) 4 mg disintegrating tablet Take 1 Tab by mouth every eight (8) hours as needed for Nausea or Vomiting.  15 Tab 0    lamoTRIgine (LAMICTAL) 200 mg tablet Take 200 mg by mouth two (2) times a day. Physical Exam  Constitutional:       Appearance: Normal appearance. HENT:      Head: Normocephalic and atraumatic. Mouth/Throat:      Mouth: Mucous membranes are moist.      Pharynx: Oropharynx is clear. No oropharyngeal exudate. Eyes:      Extraocular Movements: Extraocular movements intact. Pupils: Pupils are equal, round, and reactive to light. Neck:      Musculoskeletal: Normal range of motion and neck supple. Pulmonary:      Effort: Pulmonary effort is normal. No respiratory distress. Musculoskeletal: Normal range of motion. Right lower leg: No edema. Left lower leg: No edema. Neurological:      Mental Status: She is alert. Comments: Mental status: Awake, alert, oriented x3, follows simple and complex commands, no neglect. Speech and languge: fluent, coherent, naming and repitition intact, reading and comprehension intact  CN: Funduscopy showed clear disc margins; VFF, EOMI, PERRLA, face sensation intact , no facial asymmetry noted, palate elevation symmetric bilat, SS+SCM 5/5 bilat, tongue midline  Motor: no pronator drift, tone normal throughout, strength 5/5 throughout  Sensory: intact to light touch and PP  throughout  Coordination: FNF, HS accurate w/o dysmetria  DTR: 2+ throughout  Gait: normal.             No visits with results within 3 Month(s) from this visit.    Latest known visit with results is:   Admission on 10/18/2020, Discharged on 10/18/2020   Component Date Value Ref Range Status    Color 10/18/2020 YELLOW    Final    Appearance 10/18/2020 CLEAR    Final    Specific gravity 10/18/2020 <1.005* 1.005 - 1.030 Final    pH (UA) 10/18/2020 7.0  5.0 - 8.0   Final    Protein 10/18/2020 Negative  NEG mg/dL Final    Glucose 10/18/2020 Negative  NEG mg/dL Final    Ketone 10/18/2020 Negative  NEG mg/dL Final    Bilirubin 10/18/2020 Negative  NEG   Final    Blood 10/18/2020 Negative  NEG   Final    Urobilinogen 10/18/2020 0.2  0.2 - 1.0 EU/dL Final    Nitrites 10/18/2020 Negative  NEG   Final    Leukocyte Esterase 10/18/2020 TRACE* NEG   Final    HCG urine, QL 10/18/2020 Negative  NEG   Final    Test results should be confirmed using serum quantitative hCG when detection of pregnancy is critical and before performing any critical medical procedure.  WBC 10/18/2020 0 to 3  0 - 4 /hpf Final    Epithelial cells 10/18/2020 3+  0 - 5 /lpf Final    Bacteria 10/18/2020 1+* NEG /hpf Final             ICD-10-CM ICD-9-CM    1. Migraine with aura and with status migrainosus, not intractable  G43.101 346.03        A 38years old female patient here for follow-up of headache. Currently on topiramate 150 mg p.o. twice daily [also for her bipolar disorder from her psychiatrist]. Takes Maxalt for acute attacks which helps. Currently has about 5 headaches per month; 3 of her headaches are migraine-like with aura. Since she still has about 5 headaches per month, can increase the dose of topiramate to 200 mg p.o. twice daily. She will discuss with psychiatry about going up on the dose. We will continue with the Maxalt as needed. Discussed the need for regular sleep and eating pattern. We will see her again in 4 months time.

## 2021-06-11 ENCOUNTER — VIRTUAL VISIT (OUTPATIENT)
Dept: NEUROLOGY | Age: 36
End: 2021-06-11
Payer: OTHER GOVERNMENT

## 2021-06-11 DIAGNOSIS — G43.101 MIGRAINE WITH AURA AND WITH STATUS MIGRAINOSUS, NOT INTRACTABLE: Primary | ICD-10-CM

## 2021-06-11 PROCEDURE — 99213 OFFICE O/P EST LOW 20 MIN: CPT | Performed by: STUDENT IN AN ORGANIZED HEALTH CARE EDUCATION/TRAINING PROGRAM

## 2021-06-11 RX ORDER — RIZATRIPTAN BENZOATE 10 MG/1
10 TABLET, ORALLY DISINTEGRATING ORAL
Qty: 10 TABLET | Refills: 5 | Status: SHIPPED | OUTPATIENT
Start: 2021-06-11 | End: 2021-06-11

## 2021-06-11 RX ORDER — LURASIDONE HYDROCHLORIDE 60 MG/1
60 TABLET, FILM COATED ORAL DAILY
COMMUNITY
Start: 2021-05-24

## 2021-06-11 NOTE — PROGRESS NOTES
Jorge Moore is a 43 y.o. female on virtual visit today for follow-up on migraines. 1. Have you been to the ER, urgent care clinic since your last visit? Hospitalized since your last visit? Yes Reason for visit: Urgent Care May 2021 foot injury, hip injury. 2. Have you seen or consulted any other health care providers outside of the [address] [place] [address] since your last visit? Include any pap smears or colon screening. No    Mobile number [phone] will be used for today's visit.

## 2021-06-11 NOTE — PROGRESS NOTES
Jose M Gonzales is a 43 y.o. female who was seen by synchronous (real-time) audio-video technology on 6/11/2021 for Migraine        Assessment & Plan:   Diagnoses and all orders for this visit:    1. Migraine with aura and with status migrainosus, not intractable  -     rizatriptan (MAXALT-MLT) 10 mg disintegrating tablet; Take 1 Tablet by mouth once as needed for Migraine for up to 1 dose. The migraine headache frequency is much better currently and she rarely gets them after the dose of topiramate was increased to 200 mg p.o. twice daily. She will continue with the same dose. Has Maxalt 10 mg p.o. as needed for acute attacks; I have refilled it. We will see her again in 6 months time. Subjective:   A 43years old female patient here for follow-up of migraine headache with aura. She was last seen in clinic in February 2021. Since her last visit, the dose of topiramate was increased to 200 mg p.o. twice daily [by her psychiatrist]. Currently, she rarely gets headache. She had 1 headache for the first time in a long time about a week ago which was milder; with a rapid recovery with the Maxalt. Had photophobia with it but no vomiting. No significant changes in her vision. No weakness of her extremities. No difficulty walking. She is following with psychiatry and depression is well controlled. Prior to Admission medications    Medication Sig Start Date End Date Taking? Authorizing Provider   Latuda 40 mg tab tablet Take 40 mg by mouth daily. 2/8/21   Provider, Historical   lamoTRIgine (LaMICtaL) 200 mg tablet Take 200 mg by mouth two (2) times a day. Patient not taking: Reported on 6/11/2021    Provider, Historical   amLODIPine (NORVASC) 5 mg tablet Take 5 mg by mouth daily. Other, MD Sulma   omeprazole (PRILOSEC) 40 mg capsule Take 40 mg by mouth daily. Sulma Hernandez MD   norethindrone (MICRONOR) 0.35 mg tab Take  by mouth.   Patient not taking: Reported on 6/11/2021    OtherSulma MD topiramate (TOPAMAX) 100 mg tablet Take 150 mg by mouth two (2) times a day. Sulma Hernandez MD   prazosin (MINIPRESS) 1 mg capsule Take 3 mg by mouth nightly. Sulma Hernandez MD   albuterol (PROVENTIL HFA, VENTOLIN HFA, PROAIR HFA) 90 mcg/actuation inhaler Take  by inhalation. Sulma Hernandez MD   promethazine (PHENERGAN) 12.5 mg tablet Take 12.5 mg by mouth every six (6) hours as needed for Nausea. Sulma Hernandez MD   fenofibrate nanocrystallized (Tricor) 145 mg tablet Take 145 mg by mouth daily. Sulma Hernandez MD   busPIRone (BUSPAR) 10 mg tablet Take 10 mg by mouth two (2) times a day. Sulma Hernandez MD   cpap machine kit by Does Not Apply route. Sulma Hernandez MD   butalbital-acetaminophen-caff (Fioricet) -40 mg per capsule Take 1 Cap by mouth every four (4) hours as needed for Headache. Sulma Hernandez MD   montelukast (SINGULAIR) 10 mg tablet Take 10 mg by mouth daily. Sulma Hernandez MD   acetaminophen (TYLENOL) 325 mg tablet Take 2 Tabs by mouth every four (4) hours as needed for Pain. 2/12/20   MAKAYLA Rodriguez   ondansetron (ZOFRAN ODT) 4 mg disintegrating tablet Take 1 Tab by mouth every eight (8) hours as needed for Nausea or Vomiting. 2/12/20   MAKAYLA López   lamoTRIgine (LAMICTAL) 200 mg tablet Take 200 mg by mouth two (2) times a day. Sulma Hernandez MD     There is no problem list on file for this patient. There are no problems to display for this patient. Current Outpatient Medications   Medication Sig Dispense Refill    rizatriptan (MAXALT-MLT) 10 mg disintegrating tablet Take 1 Tablet by mouth once as needed for Migraine for up to 1 dose. 10 Tablet 5    Latuda 60 mg tab tablet Take 60 mg by mouth daily.  lamoTRIgine (LaMICtaL) 200 mg tablet Take 200 mg by mouth two (2) times a day. (Patient not taking: Reported on 6/11/2021)      amLODIPine (NORVASC) 5 mg tablet Take 5 mg by mouth daily.  omeprazole (PRILOSEC) 40 mg capsule Take 40 mg by mouth daily.  norethindrone (MICRONOR) 0.35 mg tab Take  by mouth. (Patient not taking: Reported on 6/11/2021)      topiramate (TOPAMAX) 100 mg tablet Take 150 mg by mouth two (2) times a day.  prazosin (MINIPRESS) 1 mg capsule Take 3 mg by mouth nightly.  albuterol (PROVENTIL HFA, VENTOLIN HFA, PROAIR HFA) 90 mcg/actuation inhaler Take  by inhalation.  promethazine (PHENERGAN) 12.5 mg tablet Take 12.5 mg by mouth every six (6) hours as needed for Nausea.  fenofibrate nanocrystallized (Tricor) 145 mg tablet Take 145 mg by mouth daily.  busPIRone (BUSPAR) 10 mg tablet Take 10 mg by mouth two (2) times a day.  cpap machine kit by Does Not Apply route.  montelukast (SINGULAIR) 10 mg tablet Take 10 mg by mouth daily.  acetaminophen (TYLENOL) 325 mg tablet Take 2 Tabs by mouth every four (4) hours as needed for Pain. 20 Tab 0    ondansetron (ZOFRAN ODT) 4 mg disintegrating tablet Take 1 Tab by mouth every eight (8) hours as needed for Nausea or Vomiting. 15 Tab 0    lamoTRIgine (LAMICTAL) 200 mg tablet Take 200 mg by mouth two (2) times a day.        Allergies   Allergen Reactions    Hydromorphone Anxiety, Anaphylaxis and Other (comments)    Asenapine Hives    Carbamazepine Other (comments) and Shortness of Breath     Rbc off--  Rbc off--      Pseudoephedrine Hcl Other (comments)     Upsets her mood and asthma    Upsets her mood and asthma      Bacitracin Unknown (comments)    Gold Sodium Thiomalate Unknown (comments)    Neomycin Unknown (comments)    Other Medication Other (comments)     Safris--restless leg    Saphris [Asenapine Maleate] Other (comments)     Restless leg      Suda-Tab Other (comments)    Nsaids (Non-Steroidal Anti-Inflammatory Drug) Other (comments) and Itching     Chronic kidney disease  Chronic kidney disease     Past Medical History:   Diagnosis Date    Anxiety     Asthma     Bipolar 1 disorder ([facility])     Endometriosis     Endometriosis     Gastrointestinal disorder     GERD (gastroesophageal reflux disease)     Hypertension     Infertility, female     Migraine     Ovarian cyst     Psychiatric disorder     PTSD (post-traumatic stress disorder)     Sleep apnea      Past Surgical History:   Procedure Laterality Date    HX BREAST REDUCTION  2002    HX COLONOSCOPY      HX GYN      Lt ovarian cyst removal 2017    HX WISDOM TEETH EXTRACTION       No family history on file. Social History     Tobacco Use    Smoking status: Never Smoker    Smokeless tobacco: Never Used   Substance Use Topics    Alcohol use: No       Review of Systems   Constitutional: Positive for fever (couple of dys; URI). Negative for chills and weight loss. HENT: Negative for hearing loss and tinnitus. Eyes: Negative for blurred vision and double vision. Respiratory: Positive for cough, sputum production and shortness of breath (taking steroids). Negative for hemoptysis. Cardiovascular: Negative for chest pain and leg swelling. Gastrointestinal: Negative for nausea and vomiting. Genitourinary: Negative for dysuria, frequency and urgency. Musculoskeletal: Negative for back pain and neck pain. Skin: Negative for itching and rash. Neurological: Positive for headaches (better now). Negative for dizziness, tingling, tremors, sensory change and focal weakness. Endo/Heme/Allergies: Does not bruise/bleed easily. Psychiatric/Behavioral: Negative for depression (taking medications). Objective:   No flowsheet data found.      [INSTRUCTIONS:  \"[x]\" Indicates a positive item  \"[]\" Indicates a negative item  -- DELETE ALL ITEMS NOT EXAMINED]    Constitutional: [] Appears well-developed and well-nourished [x] No apparent distress      [] Abnormal -     Mental status: [x] Alert and awake  [x] Oriented      [x] Able to follow commands    [] Abnormal -     Eyes:   EOM    [x]  Normal    [] Abnormal -   Sclera  [x]  Normal    [] Abnormal -          Discharge [x] None visible   [] Abnormal -     HENT: [x] Normocephalic, atraumatic  [] Abnormal -   [x] Mouth/Throat: Mucous membranes are moist    External Ears [] Normal  [] Abnormal -    Neck: [] No visualized mass [] Abnormal -     Pulmonary/Chest: [x] Respiratory effort normal   [x] No visualized signs of difficulty breathing or respiratory distress        [] Abnormal -      Musculoskeletal:   [x] Normal gait with no signs of ataxia         [x] Normal range of motion of neck        [] Abnormal -     Neurological:        [x] No Facial Asymmetry (Cranial nerve 7 motor function) (limited exam due to video visit)          [x] No gaze palsy        [] Abnormal -       Mental status: Awake, alert, oriented , follows simple, complex commands. Speech and languge: fluent, coherent,  and comprehension intact  CN: EOMI, PERRLA,  no facial asymmetry noted, shoulder shrug is normal, moves head from side-to-side without any difficulty,  tongue midline  Motor: no pronator drift; symmetric movement of the upper and lower extremities. Coordination: No tremor, able to touch her nose and discharge out of both arms  Gait: Normal.              Skin:        [] No significant exanthematous lesions or discoloration noted on facial skin         [] Abnormal -            Psychiatric:       [x] Normal Affect [] Abnormal -        [] No Hallucinations    Other pertinent observable physical exam findings:-        We discussed the expected course, resolution and complications of the diagnosis(es) in detail. Medication risks, benefits, costs, interactions, and alternatives were discussed as indicated. I advised her to contact the office if her condition worsens, changes or fails to improve as anticipated. She expressed understanding with the diagnosis(es) and plan. Elvinsheng Phillips was evaluated through a synchronous (real-time) audio-video encounter. The patient (or guardian if applicable) is aware that this is a billable service.  Verbal consent to proceed has been obtained within the past 12 months. The visit was conducted pursuant to the emergency declaration under the [address] [place] [address] and the [employer] General Act. Patient identification was verified, and a caregiver was present when appropriate. The patient was located in a state where the provider was credentialed to provide care.       Mita Berrios MD